# Patient Record
Sex: MALE | Race: BLACK OR AFRICAN AMERICAN | Employment: UNEMPLOYED | URBAN - METROPOLITAN AREA
[De-identification: names, ages, dates, MRNs, and addresses within clinical notes are randomized per-mention and may not be internally consistent; named-entity substitution may affect disease eponyms.]

---

## 2023-02-16 ENCOUNTER — APPOINTMENT (OUTPATIENT)
Dept: CT IMAGING | Age: 78
DRG: 689 | End: 2023-02-16
Attending: EMERGENCY MEDICINE
Payer: MEDICARE

## 2023-02-16 ENCOUNTER — HOSPITAL ENCOUNTER (INPATIENT)
Age: 78
LOS: 2 days | Discharge: HOME | DRG: 689 | End: 2023-02-19
Attending: EMERGENCY MEDICINE | Admitting: STUDENT IN AN ORGANIZED HEALTH CARE EDUCATION/TRAINING PROGRAM
Payer: MEDICARE

## 2023-02-16 DIAGNOSIS — F14.90 COCAINE USE: ICD-10-CM

## 2023-02-16 DIAGNOSIS — G93.41 METABOLIC ENCEPHALOPATHY: Primary | ICD-10-CM

## 2023-02-16 DIAGNOSIS — N39.0 ACUTE UTI: ICD-10-CM

## 2023-02-16 LAB
ALBUMIN SERPL-MCNC: 3.4 G/DL (ref 3.5–5)
ALBUMIN/GLOB SERPL: 0.7 (ref 1.1–2.2)
ALP SERPL-CCNC: 54 U/L (ref 45–117)
ALT SERPL-CCNC: 18 U/L (ref 12–78)
AMMONIA PLAS-SCNC: 22 UMOL/L
AMPHET UR QL SCN: NEGATIVE
ANION GAP BLD CALC-SCNC: 10 (ref 10–20)
ANION GAP SERPL CALC-SCNC: 6 MMOL/L (ref 5–15)
APPEARANCE UR: ABNORMAL
AST SERPL-CCNC: 17 U/L (ref 15–37)
BACTERIA URNS QL MICRO: ABNORMAL /HPF
BARBITURATES UR QL SCN: NEGATIVE
BASE DEFICIT BLDV-SCNC: 0.2 MMOL/L
BASOPHILS # BLD: 0 K/UL (ref 0–0.1)
BASOPHILS NFR BLD: 0 % (ref 0–1)
BDY SITE: NORMAL
BENZODIAZ UR QL: NEGATIVE
BILIRUB SERPL-MCNC: 0.3 MG/DL (ref 0.2–1)
BILIRUB UR QL: NEGATIVE
BUN SERPL-MCNC: 11 MG/DL (ref 6–20)
BUN/CREAT SERPL: 12 (ref 12–20)
CA-I BLD-MCNC: 1.16 MMOL/L (ref 1.12–1.32)
CALCIUM SERPL-MCNC: 8.7 MG/DL (ref 8.5–10.1)
CANNABINOIDS UR QL SCN: NEGATIVE
CHLORIDE BLD-SCNC: 103 MMOL/L (ref 100–108)
CHLORIDE SERPL-SCNC: 99 MMOL/L (ref 97–108)
CO2 BLD-SCNC: 28 MMOL/L (ref 19–24)
CO2 SERPL-SCNC: 31 MMOL/L (ref 21–32)
COCAINE UR QL SCN: POSITIVE
COLOR UR: ABNORMAL
CREAT SERPL-MCNC: 0.92 MG/DL (ref 0.7–1.3)
CREAT UR-MCNC: 0.7 MG/DL (ref 0.6–1.3)
DIFFERENTIAL METHOD BLD: NORMAL
DRUG SCRN COMMENT,DRGCM: ABNORMAL
EOSINOPHIL # BLD: 0.2 K/UL (ref 0–0.4)
EOSINOPHIL NFR BLD: 3 % (ref 0–7)
EPITH CASTS URNS QL MICRO: ABNORMAL /LPF
ERYTHROCYTE [DISTWIDTH] IN BLOOD BY AUTOMATED COUNT: 13.6 % (ref 11.5–14.5)
ETHANOL SERPL-MCNC: <10 MG/DL
GLOBULIN SER CALC-MCNC: 4.8 G/DL (ref 2–4)
GLUCOSE BLD STRIP.AUTO-MCNC: 138 MG/DL (ref 65–117)
GLUCOSE BLD STRIP.AUTO-MCNC: 142 MG/DL (ref 74–106)
GLUCOSE BLD STRIP.AUTO-MCNC: 151 MG/DL (ref 65–117)
GLUCOSE SERPL-MCNC: 143 MG/DL (ref 65–100)
GLUCOSE UR STRIP.AUTO-MCNC: NEGATIVE MG/DL
HCO3 BLDV-SCNC: 25 MMOL/L (ref 23–28)
HCT VFR BLD AUTO: 41.2 % (ref 36.6–50.3)
HGB BLD-MCNC: 13.6 G/DL (ref 12.1–17)
HGB UR QL STRIP: ABNORMAL
IMM GRANULOCYTES # BLD AUTO: 0 K/UL (ref 0–0.04)
IMM GRANULOCYTES NFR BLD AUTO: 0 % (ref 0–0.5)
KETONES UR QL STRIP.AUTO: NEGATIVE MG/DL
LACTATE BLD-SCNC: 1.61 MMOL/L (ref 0.4–2)
LEUKOCYTE ESTERASE UR QL STRIP.AUTO: ABNORMAL
LYMPHOCYTES # BLD: 1.9 K/UL (ref 0.8–3.5)
LYMPHOCYTES NFR BLD: 27 % (ref 12–49)
MCH RBC QN AUTO: 28.6 PG (ref 26–34)
MCHC RBC AUTO-ENTMCNC: 33 G/DL (ref 30–36.5)
MCV RBC AUTO: 86.6 FL (ref 80–99)
METHADONE UR QL: NEGATIVE
MONOCYTES # BLD: 0.5 K/UL (ref 0–1)
MONOCYTES NFR BLD: 7 % (ref 5–13)
NEUTS SEG # BLD: 4.4 K/UL (ref 1.8–8)
NEUTS SEG NFR BLD: 63 % (ref 32–75)
NITRITE UR QL STRIP.AUTO: POSITIVE
NRBC # BLD: 0 K/UL (ref 0–0.01)
NRBC BLD-RTO: 0 PER 100 WBC
OPIATES UR QL: NEGATIVE
PCO2 BLDV: 44.9 MMHG (ref 41–51)
PCP UR QL: NEGATIVE
PH BLDV: 7.37 (ref 7.32–7.42)
PH UR STRIP: 5.5 (ref 5–8)
PLATELET # BLD AUTO: 271 K/UL (ref 150–400)
PMV BLD AUTO: 10.7 FL (ref 8.9–12.9)
PO2 BLDV: 36 MMHG (ref 25–40)
POTASSIUM BLD-SCNC: 3.8 MMOL/L (ref 3.5–5.5)
POTASSIUM SERPL-SCNC: 4 MMOL/L (ref 3.5–5.1)
PROT SERPL-MCNC: 8.2 G/DL (ref 6.4–8.2)
PROT UR STRIP-MCNC: NEGATIVE MG/DL
RBC # BLD AUTO: 4.76 M/UL (ref 4.1–5.7)
RBC #/AREA URNS HPF: ABNORMAL /HPF (ref 0–5)
SAO2 % BLDV: 68 % (ref 65–88)
SAO2% DEVICE SAO2% SENSOR NAME: NORMAL
SERVICE CMNT-IMP: ABNORMAL
SERVICE CMNT-IMP: ABNORMAL
SODIUM BLD-SCNC: 141 MMOL/L (ref 136–145)
SODIUM SERPL-SCNC: 136 MMOL/L (ref 136–145)
SP GR UR REFRACTOMETRY: 1.01
SPECIMEN SITE: NORMAL
TSH SERPL DL<=0.05 MIU/L-ACNC: 1.31 UIU/ML (ref 0.36–3.74)
UA: UC IF INDICATED,UAUC: ABNORMAL
UROBILINOGEN UR QL STRIP.AUTO: 1 EU/DL (ref 0.2–1)
WBC # BLD AUTO: 7.1 K/UL (ref 4.1–11.1)
WBC URNS QL MICRO: ABNORMAL /HPF (ref 0–4)

## 2023-02-16 PROCEDURE — 80053 COMPREHEN METABOLIC PANEL: CPT

## 2023-02-16 PROCEDURE — 70450 CT HEAD/BRAIN W/O DYE: CPT

## 2023-02-16 PROCEDURE — 82140 ASSAY OF AMMONIA: CPT

## 2023-02-16 PROCEDURE — 81001 URINALYSIS AUTO W/SCOPE: CPT

## 2023-02-16 PROCEDURE — 87077 CULTURE AEROBIC IDENTIFY: CPT

## 2023-02-16 PROCEDURE — 74011250636 HC RX REV CODE- 250/636: Performed by: EMERGENCY MEDICINE

## 2023-02-16 PROCEDURE — 83605 ASSAY OF LACTIC ACID: CPT

## 2023-02-16 PROCEDURE — 96374 THER/PROPH/DIAG INJ IV PUSH: CPT

## 2023-02-16 PROCEDURE — 99285 EMERGENCY DEPT VISIT HI MDM: CPT

## 2023-02-16 PROCEDURE — 82803 BLOOD GASES ANY COMBINATION: CPT

## 2023-02-16 PROCEDURE — 85025 COMPLETE CBC W/AUTO DIFF WBC: CPT

## 2023-02-16 PROCEDURE — 36415 COLL VENOUS BLD VENIPUNCTURE: CPT

## 2023-02-16 PROCEDURE — 82962 GLUCOSE BLOOD TEST: CPT

## 2023-02-16 PROCEDURE — 84443 ASSAY THYROID STIM HORMONE: CPT

## 2023-02-16 PROCEDURE — 74011000250 HC RX REV CODE- 250: Performed by: EMERGENCY MEDICINE

## 2023-02-16 PROCEDURE — 96361 HYDRATE IV INFUSION ADD-ON: CPT

## 2023-02-16 PROCEDURE — 87186 SC STD MICRODIL/AGAR DIL: CPT

## 2023-02-16 PROCEDURE — 83036 HEMOGLOBIN GLYCOSYLATED A1C: CPT

## 2023-02-16 PROCEDURE — 82077 ASSAY SPEC XCP UR&BREATH IA: CPT

## 2023-02-16 PROCEDURE — 96375 TX/PRO/DX INJ NEW DRUG ADDON: CPT

## 2023-02-16 PROCEDURE — 87086 URINE CULTURE/COLONY COUNT: CPT

## 2023-02-16 PROCEDURE — 80307 DRUG TEST PRSMV CHEM ANLYZR: CPT

## 2023-02-16 PROCEDURE — 80047 BASIC METABLC PNL IONIZED CA: CPT

## 2023-02-16 PROCEDURE — 93005 ELECTROCARDIOGRAM TRACING: CPT

## 2023-02-16 RX ORDER — GUAIFENESIN 100 MG/5ML
81 LIQUID (ML) ORAL DAILY
Status: ON HOLD | COMMUNITY
End: 2023-02-17

## 2023-02-16 RX ORDER — PREGABALIN 75 MG/1
75 CAPSULE ORAL DAILY
COMMUNITY

## 2023-02-16 RX ORDER — FUROSEMIDE 80 MG/1
80 TABLET ORAL DAILY
Status: ON HOLD | COMMUNITY
End: 2023-02-17

## 2023-02-16 RX ORDER — TRAZODONE HYDROCHLORIDE 50 MG/1
TABLET ORAL
Status: ON HOLD | COMMUNITY
End: 2023-02-17

## 2023-02-16 RX ORDER — NALOXONE HYDROCHLORIDE 1 MG/ML
0.4 INJECTION INTRAMUSCULAR; INTRAVENOUS; SUBCUTANEOUS
Status: COMPLETED | OUTPATIENT
Start: 2023-02-16 | End: 2023-02-16

## 2023-02-16 RX ORDER — LISINOPRIL 5 MG/1
2.5 TABLET ORAL DAILY
COMMUNITY

## 2023-02-16 RX ORDER — TAMSULOSIN HYDROCHLORIDE 0.4 MG/1
0.4 CAPSULE ORAL DAILY
COMMUNITY

## 2023-02-16 RX ORDER — LORATADINE 10 MG/1
10 TABLET ORAL
COMMUNITY

## 2023-02-16 RX ORDER — DOCUSATE SODIUM 100 MG/1
200 CAPSULE, LIQUID FILLED ORAL DAILY
COMMUNITY

## 2023-02-16 RX ADMIN — NALOXONE HYDROCHLORIDE 0.4 MG: 1 INJECTION PARENTERAL at 19:07

## 2023-02-16 RX ADMIN — CEFTRIAXONE SODIUM 1 G: 1 INJECTION, POWDER, FOR SOLUTION INTRAMUSCULAR; INTRAVENOUS at 21:22

## 2023-02-16 NOTE — ED TRIAGE NOTES
Arrives by EMS c/o hyperglycemia. Per EM pt's BG reading >600 but pt's  when checked in ED. Pt is very somnolent not answering questions very well. Pt oriented to himself. Pt usually goes to [place] for care so we have no baseline for pt. Pt has vomit and urine on himself. Pt does not walk, uses a motorized wheelchair to get around.

## 2023-02-16 NOTE — ED PROVIDER NOTES
[facility] EMERGENCY DEPT  EMERGENCY DEPARTMENT ENCOUNTER       Pt Name: Hortencia Lazo  MRN: [id number]  [place] 1945  Date of evaluation: 2/16/2023  Provider: Kameron Manuel MD   PCP: None  Note Started: 6:51 PM 2/16/23     CHIEF COMPLAINT       Chief Complaint   Patient presents with    Altered mental status        HISTORY OF PRESENT ILLNESS: 1 or more elements      History From: EMS, patient History limited by: Fritz Stringer is a 66 y.o. male with history of diabetes who presents via EMS for altered mental status. EMS reports that his blood sugar was reading \"high\" as well. Please See MDM for Additional Details of the HPI/PMH  Nursing Notes were all reviewed and agreed with or any disagreements were addressed in the HPI. REVIEW OF SYSTEMS        Positives and Pertinent negatives as per HPI. PAST HISTORY     Past Medical History:  History reviewed. No pertinent past medical history. Past Surgical History:  History reviewed. No pertinent surgical history. Family History:  History reviewed. No pertinent family history. Social History:  Social History     Tobacco Use    Smoking status: Unknown       Allergies: Allergies   Allergen Reactions    Onion Unknown (comments)       CURRENT MEDICATIONS      Previous Medications    No medications on file       SCREENINGS               No data recorded         PHYSICAL EXAM      ED Triage Vitals [02/16/23 1818]   ED Encounter Vitals Group      /63      Pulse (Heart Rate) 68      Resp Rate 25      Temp 97.8 °F (36.6 °C)      Temp src       O2 Sat (%) 99 %      Weight 280 lb      Height 5' 10\"        Physical Exam  Vitals and nursing note reviewed. Constitutional:       General: He is not in acute distress. Appearance: He is well-developed. HENT:      Head: Normocephalic and atraumatic.    Eyes:      Conjunctiva/sclera: Conjunctivae normal.      Comments: Pinpoint pupils bilaterally   Cardiovascular:      Rate and Rhythm: Normal rate and regular rhythm. Pulmonary:      Effort: Pulmonary effort is normal. No respiratory distress. Breath sounds: Normal breath sounds. No stridor. Abdominal:      General: There is no distension. Palpations: Abdomen is soft. Tenderness: There is no abdominal tenderness. Musculoskeletal:         General: Normal range of motion. Cervical back: Normal range of motion. Skin:     General: Skin is warm and dry. Neurological:      Mental Status: He is lethargic. Comments: Arouses to loud verbal stimuli but quickly falls back asleep. Is able to state his name and birthday but speech is somewhat slurred        DIAGNOSTIC RESULTS   LABS:     Recent Results (from the past 12 hour(s))   GLUCOSE, POC    Collection Time: 02/16/23  6:17 PM   Result Value Ref Range    Glucose (POC) 151 (H) 65 - 117 mg/dL    Performed by Nicolás Silvestre EDT    CBC WITH AUTOMATED DIFF    Collection Time: 02/16/23  6:30 PM   Result Value Ref Range    WBC 7.1 4.1 - 11.1 K/uL    RBC 4.76 4.10 - 5.70 M/uL    HGB 13.6 12.1 - 17.0 g/dL    HCT 41.2 36.6 - 50.3 %    MCV 86.6 80.0 - 99.0 FL    MCH 28.6 26.0 - 34.0 PG    MCHC 33.0 30.0 - 36.5 g/dL    RDW 13.6 11.5 - 14.5 %    PLATELET 088 256 - 376 K/uL    MPV 10.7 8.9 - 12.9 FL    NRBC 0.0 0  WBC    ABSOLUTE NRBC 0.00 0.00 - 0.01 K/uL    NEUTROPHILS 63 32 - 75 %    LYMPHOCYTES 27 12 - 49 %    MONOCYTES 7 5 - 13 %    EOSINOPHILS 3 0 - 7 %    BASOPHILS 0 0 - 1 %    IMMATURE GRANULOCYTES 0 0.0 - 0.5 %    ABS. NEUTROPHILS 4.4 1.8 - 8.0 K/UL    ABS. LYMPHOCYTES 1.9 0.8 - 3.5 K/UL    ABS. MONOCYTES 0.5 0.0 - 1.0 K/UL    ABS. EOSINOPHILS 0.2 0.0 - 0.4 K/UL    ABS. BASOPHILS 0.0 0.0 - 0.1 K/UL    ABS. IMM.  GRANS. 0.0 0.00 - 0.04 K/UL    DF AUTOMATED     METABOLIC PANEL, COMPREHENSIVE    Collection Time: 02/16/23  6:30 PM   Result Value Ref Range    Sodium 136 136 - 145 mmol/L    Potassium 4.0 3.5 - 5.1 mmol/L    Chloride 99 97 - 108 mmol/L    CO2 31 21 - 32 mmol/L Anion gap 6 5 - 15 mmol/L    Glucose 143 (H) 65 - 100 mg/dL    BUN 11 6 - 20 MG/DL    Creatinine 0.92 0.70 - 1.30 MG/DL    BUN/Creatinine ratio 12 12 - 20      eGFR >60 >60 ml/min/1.73m2    Calcium 8.7 8.5 - 10.1 MG/DL    Bilirubin, total 0.3 0.2 - 1.0 MG/DL    ALT (SGPT) 18 12 - 78 U/L    AST (SGOT) 17 15 - 37 U/L    Alk. phosphatase 54 45 - 117 U/L    Protein, total 8.2 6.4 - 8.2 g/dL    Albumin 3.4 (L) 3.5 - 5.0 g/dL    Globulin 4.8 (H) 2.0 - 4.0 g/dL    A-G Ratio 0.7 (L) 1.1 - 2.2     TSH 3RD GENERATION    Collection Time: 02/16/23  6:30 PM   Result Value Ref Range    TSH 1.31 0.36 - 3.74 uIU/mL   AMMONIA    Collection Time: 02/16/23  6:30 PM   Result Value Ref Range    Ammonia, plasma 22 <32 UMOL/L   ETHYL ALCOHOL    Collection Time: 02/16/23  6:30 PM   Result Value Ref Range    ALCOHOL(ETHYL),SERUM <10 <10 MG/DL   GLUCOSE, POC    Collection Time: 02/16/23  6:30 PM   Result Value Ref Range    Glucose (POC) 138 (H) 65 - 117 mg/dL    Performed by Costa Renteria) EDT    BLOOD GAS, VENOUS    Collection Time: 02/16/23  6:33 PM   Result Value Ref Range    VENOUS PH 7.37 7.32 - 7.42      VENOUS PCO2 44.9 41 - 51 mmHg    VENOUS PO2 36 25 - 40 mmHg    VENOUS BICARBONATE 25 23 - 28 mmol/L    VENOUS BASE DEFICIT 0.2 mmol/L    VENOUS O2 SATURATION 68 65 - 88 %    O2 METHOD ROOM AIR      Sample source VENOUS BLOOD      SITE OTHER     POC CHEM8    Collection Time: 02/16/23  6:48 PM   Result Value Ref Range    CO2, POC 28 (H) 19 - 24 MMOL/L    Glucose,  (H) 74 - 106 MG/DL    Creatinine, POC 0.7 0.6 - 1.3 MG/DL    eGFR (POC) >60 >60 ml/min/1.73m2    Sodium,  136 - 145 MMOL/L    Potassium, POC 3.8 3.5 - 5.5 MMOL/L    Calcium, ionized (POC) 1.16 1.12 - 1.32 mmol/L    Chloride,  100 - 108 MMOL/L    Anion gap, POC 10 10 - 20     POC LACTIC ACID    Collection Time: 02/16/23  6:48 PM   Result Value Ref Range    Lactic Acid (POC) 1.61 0.40 - 2.00 mmol/L        EKG:  If performed, independent interpretation documented below in the MDM section     RADIOLOGY:  Non-plain film images such as CT, Ultrasound and MRI are read by the radiologist. Plain radiographic images are visualized and preliminarily interpreted by the ED Provider with the findings documented in the MDM section. Interpretation per the Radiologist below, if available at the time of this note:     CT HEAD WO CONT    Result Date: 2/16/2023  EXAM: CT HEAD WO CONT INDICATION: ams COMPARISON: None. CONTRAST: None. TECHNIQUE: Unenhanced CT of the head was performed using 5 mm images. Brain and bone windows were generated. Coronal and sagittal reformats. CT dose reduction was achieved through use of a standardized protocol tailored for this examination and automatic exposure control for dose modulation. FINDINGS: Motion degraded images limits diagnostic quality. The ventricles and sulci are normal in size, shape and configuration. Patchy periventricular and deep white matter ill-defined hypodensities, nonspecific and likely microangiopathic white matter disease. There is no intracranial hemorrhage, extra-axial collection, or mass effect. The basilar cisterns are open. No CT evidence of acute infarct. The bone windows demonstrate no abnormalities. The visualized portions of the paranasal sinuses and mastoid air cells are clear. Midline suboccipital subcutaneous architectural distortion/stranding may suggest prior trauma. No acute intracranial hemorrhage or infarct, allowing for limitation due to motion degraded images. Justine Carreno         PROCEDURES   Unless otherwise noted below, none  EKG    Date/Time: 2/16/2023 7:37 PM  Performed by: Jessica Suarez MD  Authorized by: Jessica Suarez MD     ECG reviewed by ED Physician in the absence of a cardiologist: yes    Interpretation:     Interpretation: non-specific    Rate:     ECG rate:  67    ECG rate assessment: normal    Rhythm:     Rhythm: sinus rhythm    Ectopy:     Ectopy: PVCs    QRS:     QRS axis: Normal    QRS intervals:  Normal    QRS conduction: normal    ST segments:     ST segments:  Non-specific  T waves:     T waves: non-specific    Critical Care  Performed by: Juan Pulido MD  Authorized by: Juan Pulido MD     Critical care provider statement:     Critical care time (minutes):  40    Critical care time was exclusive of:  Separately billable procedures and treating other patients    Critical care was necessary to treat or prevent imminent or life-threatening deterioration of the following conditions:  CNS failure or compromise    Critical care was time spent personally by me on the following activities:  Ordering and performing treatments and interventions, ordering and review of laboratory studies, ordering and review of radiographic studies, pulse oximetry, re-evaluation of patient's condition, review of old charts, examination of patient and evaluation of patient's response to treatment    Care discussed with: admitting provider       CRITICAL CARE TIME   above    [facility] and DIFFERENTIAL DIAGNOSIS/MDM   Vitals:    Vitals:    02/16/23 1818   BP: 127/63   Pulse: 68   Resp: 25   Temp: 97.8 °F (36.6 °C)   SpO2: 99%   Weight: 127 kg (280 lb)   Height: 5' 10\" (1.778 m)        Patient was given the following medications:  Medications   (NARCAN) injection (0.4 mg IntraVENous Given 2/16/23 1907)       Medical Decision Making  60-year-old male with a history of diabetes, hypertension, YOUSIF, wheelchair-bound at baseline due to a spinal cord injury who presents via EMS with concerns for altered mental status and hyperglycemia. EMS states that they were called for a \"unresponsive\" person but when they arrived patient was not unresponsive. They state that he was slowed respond to questions but was able to answer everything for them. They tell me that his blood sugar read \"high\".   [facility] but they were told that the [place] was on \"ICU diversion\" so they could not bring the patient there. They report the patient takes metformin but does not take anything else for diabetes. Patient is quite lethargic on presentation, arouses to loud verbal stimuli but quickly falls back asleep. Able to tell me his name and his date of birth. On exam, his vital signs are stable. He is not hypoxic on room air. Afebrile. Blood glucose on arrival 151. He does have very pinpoint pupils, unclear if his history includes opioid use or not. Differential includes electrolyte derangements, anemia, UTI, drug use, acute intracranial process such as stroke or bleed. Given the unclear last known well, patient would not be a candidate for lytics. I am also unsure whether or not he takes any anticoagulants. We will check basic lab work to rule out severe electrolyte derangements or anemia. Will check VBG to rule out hypercarbia as the cause of patient's somnolence. Will check head CT to rule out acute intracranial process. Will check alcohol level, ammonia, UDS to rule out other causes of altered mental status. We will try to get in touch with patient's family     Problems Addressed:  Acute UTI: acute illness or injury  Cocaine use: acute illness or injury  Metabolic encephalopathy: acute illness or injury    Amount and/or Complexity of Data Reviewed  Independent Historian:      Details: daughter  Labs: ordered. Decision-making details documented in ED Course. Radiology: ordered. Decision-making details documented in ED Course. ECG/medicine tests: ordered and independent interpretation performed. Decision-making details documented in ED Course. Risk  Prescription drug management. Decision regarding hospitalization. ED Course as of 02/17/23 1638   Thu Feb 16, 2023   1839 Spoke with patient's daughter, Izzy Overton. She states the patient has a history of diabetes, high blood pressure and does wear CPAP at night.   She denies any history of seizures and says he only occasionally drinks alcohol. Apparently the nurse called her brother to say that the patient was disoriented and has had some nausea and vomiting ongoing for about a week or so. He is from home. Daughter states she saw him a couple of days ago and he was fine. She reports that he is a full code. [FRANCO]   1933 No change with 0.4mg of narcan IV [FRANCO]   1940 Patient signed out to Dr. Clover Poon. Dispo pending remaining labs/urine/re-eval. Low threshold for admission [FRANCO]   2056 Urinalysis showing positive nitrites and leukocytes, +4 bacteria. UDS positive for cocaine. Patient remains altered and not at his baseline per her daughter, given above, plan for admission for IV antibiotic therapy and close neuro monitoring. [HW]   2100 ED Consult Note:   Aung Olea MD spoke with Dr. Kofi Jimenez  Specialty: Hospitalist   Discussed pt's hx, presentation, current and ongoing workup. Reviewed available diagnostic data and care plans. Agree with management and plan thus far. Consultant will admit patient. [HW]      ED Course User Index  [HW] Jossy Kc MD  Mattyronnie Pope MD         FINAL IMPRESSION     1. Metabolic encephalopathy    2. Acute UTI    3. Cocaine use          DISPOSITION/PLAN       CLINICAL IMPRESSION    Admit Note: Pt is being admitted by Dr. Sulma Castillo. The results of their tests and reason(s) for their admission have been discussed with pt and/or available family. They convey agreement and understanding for the need to be admitted and for the admission diagnosis. I am the Primary Clinician of Record. Janie Lechuga MD (electronically signed)    (Please note that parts of this dictation were completed with voice recognition software. Quite often unanticipated grammatical, syntax, homophones, and other interpretive errors are inadvertently transcribed by the computer software. Please disregards these errors.  Please excuse any errors that have escaped final proofreading.)

## 2023-02-17 ENCOUNTER — APPOINTMENT (OUTPATIENT)
Dept: GENERAL RADIOLOGY | Age: 78
DRG: 689 | End: 2023-02-17
Attending: STUDENT IN AN ORGANIZED HEALTH CARE EDUCATION/TRAINING PROGRAM
Payer: MEDICARE

## 2023-02-17 PROBLEM — G92.8 TOXIC METABOLIC ENCEPHALOPATHY: Status: ACTIVE | Noted: 2023-02-17

## 2023-02-17 LAB
ATRIAL RATE: 66 BPM
CALCULATED P AXIS, ECG09: 53 DEGREES
CALCULATED R AXIS, ECG10: 14 DEGREES
CALCULATED T AXIS, ECG11: 48 DEGREES
DIAGNOSIS, 93000: NORMAL
EST. AVERAGE GLUCOSE BLD GHB EST-MCNC: 140 MG/DL
GLUCOSE BLD STRIP.AUTO-MCNC: 117 MG/DL (ref 65–117)
GLUCOSE BLD STRIP.AUTO-MCNC: 123 MG/DL (ref 65–117)
GLUCOSE BLD STRIP.AUTO-MCNC: 133 MG/DL (ref 65–117)
GLUCOSE BLD STRIP.AUTO-MCNC: 178 MG/DL (ref 65–117)
GLUCOSE BLD STRIP.AUTO-MCNC: 88 MG/DL (ref 65–117)
HBA1C MFR BLD: 6.5 % (ref 4–5.6)
P-R INTERVAL, ECG05: 190 MS
Q-T INTERVAL, ECG07: 428 MS
QRS DURATION, ECG06: 94 MS
QTC CALCULATION (BEZET), ECG08: 448 MS
SERVICE CMNT-IMP: ABNORMAL
SERVICE CMNT-IMP: NORMAL
SERVICE CMNT-IMP: NORMAL
TROPONIN I SERPL HS-MCNC: 14 NG/L (ref 0–76)
VENTRICULAR RATE, ECG03: 66 BPM

## 2023-02-17 PROCEDURE — 71045 X-RAY EXAM CHEST 1 VIEW: CPT

## 2023-02-17 PROCEDURE — 82962 GLUCOSE BLOOD TEST: CPT

## 2023-02-17 PROCEDURE — 74011250637 HC RX REV CODE- 250/637: Performed by: INTERNAL MEDICINE

## 2023-02-17 PROCEDURE — 65270000032 HC RM SEMIPRIVATE

## 2023-02-17 PROCEDURE — 84484 ASSAY OF TROPONIN QUANT: CPT

## 2023-02-17 PROCEDURE — 74011000250 HC RX REV CODE- 250: Performed by: STUDENT IN AN ORGANIZED HEALTH CARE EDUCATION/TRAINING PROGRAM

## 2023-02-17 PROCEDURE — 97530 THERAPEUTIC ACTIVITIES: CPT | Performed by: PHYSICAL THERAPIST

## 2023-02-17 PROCEDURE — 97165 OT EVAL LOW COMPLEX 30 MIN: CPT | Performed by: OCCUPATIONAL THERAPIST

## 2023-02-17 PROCEDURE — 74011250636 HC RX REV CODE- 250/636: Performed by: STUDENT IN AN ORGANIZED HEALTH CARE EDUCATION/TRAINING PROGRAM

## 2023-02-17 PROCEDURE — 97530 THERAPEUTIC ACTIVITIES: CPT | Performed by: OCCUPATIONAL THERAPIST

## 2023-02-17 PROCEDURE — 74011250637 HC RX REV CODE- 250/637: Performed by: STUDENT IN AN ORGANIZED HEALTH CARE EDUCATION/TRAINING PROGRAM

## 2023-02-17 PROCEDURE — 97161 PT EVAL LOW COMPLEX 20 MIN: CPT | Performed by: PHYSICAL THERAPIST

## 2023-02-17 PROCEDURE — 87150 DNA/RNA AMPLIFIED PROBE: CPT

## 2023-02-17 PROCEDURE — 74011000258 HC RX REV CODE- 258: Performed by: STUDENT IN AN ORGANIZED HEALTH CARE EDUCATION/TRAINING PROGRAM

## 2023-02-17 PROCEDURE — 87040 BLOOD CULTURE FOR BACTERIA: CPT

## 2023-02-17 RX ORDER — IBUPROFEN 200 MG
4 TABLET ORAL AS NEEDED
Status: DISCONTINUED | OUTPATIENT
Start: 2023-02-17 | End: 2023-02-19 | Stop reason: HOSPADM

## 2023-02-17 RX ORDER — SODIUM CHLORIDE 0.9 % (FLUSH) 0.9 %
5-40 SYRINGE (ML) INJECTION EVERY 8 HOURS
Status: DISCONTINUED | OUTPATIENT
Start: 2023-02-17 | End: 2023-02-19 | Stop reason: HOSPADM

## 2023-02-17 RX ORDER — TAMSULOSIN HYDROCHLORIDE 0.4 MG/1
0.4 CAPSULE ORAL DAILY
Status: DISCONTINUED | OUTPATIENT
Start: 2023-02-17 | End: 2023-02-19 | Stop reason: HOSPADM

## 2023-02-17 RX ORDER — SODIUM CHLORIDE 9 MG/ML
150 INJECTION, SOLUTION INTRAVENOUS CONTINUOUS
Status: DISCONTINUED | OUTPATIENT
Start: 2023-02-17 | End: 2023-02-17

## 2023-02-17 RX ORDER — FLUTICASONE PROPIONATE 50 MCG
2 SPRAY, SUSPENSION (ML) NASAL 2 TIMES DAILY
Status: DISCONTINUED | OUTPATIENT
Start: 2023-02-18 | End: 2023-02-19 | Stop reason: HOSPADM

## 2023-02-17 RX ORDER — MESALAMINE 400 MG/1
800 CAPSULE, DELAYED RELEASE ORAL 3 TIMES DAILY
Status: DISCONTINUED | OUTPATIENT
Start: 2023-02-18 | End: 2023-02-19 | Stop reason: HOSPADM

## 2023-02-17 RX ORDER — ACETAMINOPHEN 650 MG/1
650 SUPPOSITORY RECTAL
Status: DISCONTINUED | OUTPATIENT
Start: 2023-02-17 | End: 2023-02-19 | Stop reason: HOSPADM

## 2023-02-17 RX ORDER — POLYETHYLENE GLYCOL 3350 17 G/17G
17 POWDER, FOR SOLUTION ORAL DAILY PRN
Status: DISCONTINUED | OUTPATIENT
Start: 2023-02-17 | End: 2023-02-19 | Stop reason: HOSPADM

## 2023-02-17 RX ORDER — INSULIN LISPRO 100 [IU]/ML
INJECTION, SOLUTION INTRAVENOUS; SUBCUTANEOUS EVERY 6 HOURS
Status: DISCONTINUED | OUTPATIENT
Start: 2023-02-17 | End: 2023-02-17

## 2023-02-17 RX ORDER — DEXTROSE MONOHYDRATE 100 MG/ML
0-250 INJECTION, SOLUTION INTRAVENOUS AS NEEDED
Status: DISCONTINUED | OUTPATIENT
Start: 2023-02-17 | End: 2023-02-19 | Stop reason: HOSPADM

## 2023-02-17 RX ORDER — ACETAMINOPHEN 325 MG/1
650 TABLET ORAL
COMMUNITY

## 2023-02-17 RX ORDER — PREGABALIN 75 MG/1
75 CAPSULE ORAL 2 TIMES DAILY
Status: DISCONTINUED | OUTPATIENT
Start: 2023-02-17 | End: 2023-02-17

## 2023-02-17 RX ORDER — ONDANSETRON 4 MG/1
4 TABLET, ORALLY DISINTEGRATING ORAL
Status: DISCONTINUED | OUTPATIENT
Start: 2023-02-17 | End: 2023-02-19 | Stop reason: HOSPADM

## 2023-02-17 RX ORDER — GUAIFENESIN 100 MG/5ML
200 SOLUTION ORAL
COMMUNITY

## 2023-02-17 RX ORDER — PREGABALIN 75 MG/1
150 CAPSULE ORAL
Status: DISCONTINUED | OUTPATIENT
Start: 2023-02-17 | End: 2023-02-19 | Stop reason: HOSPADM

## 2023-02-17 RX ORDER — SODIUM CHLORIDE 0.9 % (FLUSH) 0.9 %
5-40 SYRINGE (ML) INJECTION AS NEEDED
Status: DISCONTINUED | OUTPATIENT
Start: 2023-02-17 | End: 2023-02-19 | Stop reason: HOSPADM

## 2023-02-17 RX ORDER — ALBUTEROL SULFATE 90 UG/1
2 AEROSOL, METERED RESPIRATORY (INHALATION)
COMMUNITY

## 2023-02-17 RX ORDER — BACLOFEN 10 MG/1
20 TABLET ORAL DAILY
COMMUNITY

## 2023-02-17 RX ORDER — BACLOFEN 10 MG/1
20 TABLET ORAL DAILY
Status: DISCONTINUED | OUTPATIENT
Start: 2023-02-18 | End: 2023-02-19 | Stop reason: HOSPADM

## 2023-02-17 RX ORDER — CAPSAICIN 0.03 G/100G
1 CREAM TOPICAL
COMMUNITY

## 2023-02-17 RX ORDER — DIPHENHYDRAMINE HCL 25 MG
2 CAPSULE ORAL
COMMUNITY

## 2023-02-17 RX ORDER — PREGABALIN 150 MG/1
150 CAPSULE ORAL
COMMUNITY

## 2023-02-17 RX ORDER — ROSUVASTATIN CALCIUM 40 MG/1
40 TABLET, COATED ORAL DAILY
COMMUNITY

## 2023-02-17 RX ORDER — CETIRIZINE HYDROCHLORIDE 10 MG/1
10 TABLET ORAL DAILY
Status: DISCONTINUED | OUTPATIENT
Start: 2023-02-18 | End: 2023-02-19 | Stop reason: HOSPADM

## 2023-02-17 RX ORDER — CALCIUM CARBONATE 200(500)MG
1 TABLET,CHEWABLE ORAL
COMMUNITY

## 2023-02-17 RX ORDER — SENNOSIDES 8.6 MG/1
1 TABLET ORAL
COMMUNITY

## 2023-02-17 RX ORDER — SENNOSIDES 8.6 MG/1
1 TABLET ORAL
Status: DISCONTINUED | OUTPATIENT
Start: 2023-02-17 | End: 2023-02-19 | Stop reason: HOSPADM

## 2023-02-17 RX ORDER — FLUTICASONE PROPIONATE 50 MCG
2 SPRAY, SUSPENSION (ML) NASAL 2 TIMES DAILY
COMMUNITY

## 2023-02-17 RX ORDER — CHOLECALCIFEROL (VITAMIN D3) 125 MCG
1 CAPSULE ORAL DAILY
COMMUNITY

## 2023-02-17 RX ORDER — ONDANSETRON 2 MG/ML
4 INJECTION INTRAMUSCULAR; INTRAVENOUS
Status: DISCONTINUED | OUTPATIENT
Start: 2023-02-17 | End: 2023-02-19 | Stop reason: HOSPADM

## 2023-02-17 RX ORDER — DOCUSATE SODIUM 100 MG/1
100 CAPSULE, LIQUID FILLED ORAL 2 TIMES DAILY
Status: DISCONTINUED | OUTPATIENT
Start: 2023-02-17 | End: 2023-02-19 | Stop reason: HOSPADM

## 2023-02-17 RX ORDER — GUAIFENESIN 100 MG/5ML
81 LIQUID (ML) ORAL DAILY
Status: DISCONTINUED | OUTPATIENT
Start: 2023-02-17 | End: 2023-02-19 | Stop reason: HOSPADM

## 2023-02-17 RX ORDER — TIZANIDINE 4 MG/1
4 TABLET ORAL
Status: DISCONTINUED | OUTPATIENT
Start: 2023-02-17 | End: 2023-02-19 | Stop reason: HOSPADM

## 2023-02-17 RX ORDER — INSULIN LISPRO 100 [IU]/ML
INJECTION, SOLUTION INTRAVENOUS; SUBCUTANEOUS
Status: DISCONTINUED | OUTPATIENT
Start: 2023-02-17 | End: 2023-02-19 | Stop reason: HOSPADM

## 2023-02-17 RX ORDER — ACETAMINOPHEN 325 MG/1
650 TABLET ORAL
Status: DISCONTINUED | OUTPATIENT
Start: 2023-02-17 | End: 2023-02-19 | Stop reason: HOSPADM

## 2023-02-17 RX ORDER — MESALAMINE 0.38 G/1
1.5 CAPSULE, EXTENDED RELEASE ORAL DAILY
COMMUNITY

## 2023-02-17 RX ORDER — LISINOPRIL 5 MG/1
5 TABLET ORAL DAILY
Status: DISCONTINUED | OUTPATIENT
Start: 2023-02-17 | End: 2023-02-18

## 2023-02-17 RX ORDER — TIZANIDINE 4 MG/1
4 TABLET ORAL
COMMUNITY

## 2023-02-17 RX ORDER — ROSUVASTATIN CALCIUM 10 MG/1
40 TABLET, COATED ORAL DAILY
Status: DISCONTINUED | OUTPATIENT
Start: 2023-02-18 | End: 2023-02-19 | Stop reason: HOSPADM

## 2023-02-17 RX ORDER — BACLOFEN 10 MG/1
30 TABLET ORAL 2 TIMES DAILY
COMMUNITY

## 2023-02-17 RX ORDER — HYDROCHLOROTHIAZIDE 12.5 MG/1
12.5 TABLET ORAL DAILY
COMMUNITY

## 2023-02-17 RX ORDER — METFORMIN HYDROCHLORIDE 1000 MG/1
1000 TABLET ORAL 2 TIMES DAILY WITH MEALS
COMMUNITY

## 2023-02-17 RX ADMIN — PREGABALIN 150 MG: 75 CAPSULE ORAL at 22:52

## 2023-02-17 RX ADMIN — TAMSULOSIN HYDROCHLORIDE 0.4 MG: 0.4 CAPSULE ORAL at 08:51

## 2023-02-17 RX ADMIN — SODIUM CHLORIDE, PRESERVATIVE FREE 10 ML: 5 INJECTION INTRAVENOUS at 22:52

## 2023-02-17 RX ADMIN — CEFEPIME 2 G: 2 INJECTION, POWDER, FOR SOLUTION INTRAVENOUS at 08:51

## 2023-02-17 RX ADMIN — ASPIRIN 81 MG CHEWABLE TABLET 81 MG: 81 TABLET CHEWABLE at 08:51

## 2023-02-17 RX ADMIN — SODIUM CHLORIDE 1000 ML: 9 INJECTION, SOLUTION INTRAVENOUS at 00:37

## 2023-02-17 RX ADMIN — SODIUM CHLORIDE 150 ML/HR: 9 INJECTION, SOLUTION INTRAVENOUS at 01:57

## 2023-02-17 RX ADMIN — SODIUM CHLORIDE 150 ML/HR: 9 INJECTION, SOLUTION INTRAVENOUS at 08:55

## 2023-02-17 RX ADMIN — TIZANIDINE 4 MG: 4 TABLET ORAL at 22:52

## 2023-02-17 RX ADMIN — LISINOPRIL 5 MG: 5 TABLET ORAL at 08:51

## 2023-02-17 RX ADMIN — SODIUM CHLORIDE 2 G: 9 INJECTION INTRAMUSCULAR; INTRAVENOUS; SUBCUTANEOUS at 00:37

## 2023-02-17 RX ADMIN — SENNOSIDES 8.6 MG: 8.6 TABLET, FILM COATED ORAL at 22:52

## 2023-02-17 RX ADMIN — CEFEPIME 2 G: 2 INJECTION, POWDER, FOR SOLUTION INTRAVENOUS at 16:25

## 2023-02-17 RX ADMIN — DOCUSATE SODIUM 100 MG: 100 CAPSULE, LIQUID FILLED ORAL at 08:51

## 2023-02-17 RX ADMIN — DOCUSATE SODIUM 100 MG: 100 CAPSULE, LIQUID FILLED ORAL at 18:00

## 2023-02-17 RX ADMIN — SODIUM CHLORIDE, PRESERVATIVE FREE 10 ML: 5 INJECTION INTRAVENOUS at 00:18

## 2023-02-17 RX ADMIN — PREGABALIN 75 MG: 75 CAPSULE ORAL at 18:00

## 2023-02-17 RX ADMIN — PREGABALIN 75 MG: 75 CAPSULE ORAL at 08:51

## 2023-02-17 NOTE — PROGRESS NOTES
[facility]: MED RECONCILIATION    Comments/Recommendations:   Med rec performed via call to [facility] to obtain medication refill history. Patient was a poor historian but reports all medications come from [facility]. Patient reports having home health once per month to help set up his medications for him. [facility], patient[facility] 2022. Per patient, he is not currently taking aspirin. Med rec based on refill history - please interpret with caution. Allergies: Onion    Prior to Admission Medications:       Prior to Admission Medications   Prescriptions Last Dose Informant Patient Reported? Taking?   acetaminophen (TYLENOL) 325 mg tablet  Other Yes Yes   Sig: Take 650 mg by mouth every four (4) hours as needed for Pain. albuterol (PROVENTIL HFA, VENTOLIN HFA, PROAIR HFA) 90 mcg/actuation inhaler  Other Yes Yes   Sig: Take 2 Puffs by inhalation every six (6) hours as needed for Wheezing. baclofen (LIORESAL) 10 mg tablet  Other Yes Yes   Sig: Take 20 mg by mouth daily. Every morning   baclofen (LIORESAL) 10 mg tablet  Other Yes Yes   Sig: Take 30 mg by mouth two (2) times a day. At 1400, 2000   calcium carbonate (TUMS) 200 mg calcium (500 mg) chew  Other Yes Yes   Sig: Take 1 Tablet by mouth four (4) times daily as needed (indigestion). capsicum oleoresin 0.025 % topical cream  Other Yes Yes   Sig: Apply 1 Each to affected area three (3) times daily as needed for Pain (shoulder pain). cholecalciferol, vitamin D3, 50 mcg (2,000 unit) tab  Other Yes Yes   Sig: Take 1 Tablet by mouth daily. dextran 70-hypromellose (Artificial Tears,[id number],) ophthalmic solution  Other Yes Yes   Sig: Administer 2 Drops to both eyes four (4) times daily as needed (dry eye). docusate sodium (COLACE) 100 mg capsule  Other Yes Yes   Sig: Take 200 mg by mouth daily.    fluticasone propionate (FLONASE) 50 mcg/actuation nasal spray  Other Yes Yes   Si Sprays by Both Nostrils route two (2) times a day.   guaiFENesin (ROBITUSSIN) 100 mg/5 mL liquid  Other Yes Yes   Sig: Take 200 mg by mouth three (3) times daily as needed for Cough. hydroCHLOROthiazide (HYDRODIURIL) 12.5 mg tablet  Other Yes Yes   Sig: Take 12.5 mg by mouth daily. lisinopriL (PRINIVIL, ZESTRIL) 5 mg tablet  Other Yes Yes   Sig: Take 2.5 mg by mouth daily. loratadine (CLARITIN) 10 mg tablet  Other Yes Yes   Sig: Take 10 mg by mouth daily as needed for Allergies. mesalamine ER (APRISO) 0.375 gram 24 hour capsule  Other Yes Yes   Sig: Take 1.5 g by mouth daily. metFORMIN (GLUCOPHAGE) 1,000 mg tablet  Other Yes Yes   Sig: Take 1,000 mg by mouth two (2) times daily (with meals). pregabalin (LYRICA) 150 mg capsule  Other Yes Yes   Sig: Take 150 mg by mouth nightly. pregabalin (LYRICA) 75 mg capsule  Other Yes Yes   Sig: Take 75 mg by mouth daily. rosuvastatin (CRESTOR) 40 mg tablet  Other Yes Yes   Sig: Take 40 mg by mouth daily. senna (SENOKOT) 8.6 mg tablet  Other Yes Yes   Sig: Take 1 Tablet by mouth nightly. tamsulosin (FLOMAX) 0.4 mg capsule  Other Yes Yes   Sig: Take 0.4 mg by mouth daily. tiZANidine (ZANAFLEX) 4 mg tablet  Other Yes Yes   Sig: Take 4 mg by mouth nightly as needed for Muscle Spasm(s).       Facility-Administered Medications: None         CRISTOBAL Clinton   Contact: [phone]

## 2023-02-17 NOTE — PROGRESS NOTES
Problem: Self Care Deficits Care Plan (Adult)  Goal: *Acute Goals and Plan of Care (Insert Text)  Description: FUNCTIONAL STATUS PRIOR TO ADMISSION: Pt is w/c dependant at baseline, has aides 7 hours a day for assist with ADLs and is able to perform stand pivot transfers and toileting with mod I. Pt has an adapted vehicle and drives with hand controls. HOME SUPPORT: The patient lived with aides 7 hours a day and required moderate assistance for LE ADLs, up to max A IADLs. Occupational Therapy Goals  Initiated 2/17/2023  1. Patient will perform toilet transfers to drop arm heavy duty [facility] with supervision/set-up within 7 day(s). 2.  Patient will perform all aspects of toileting with supervision/set-up within 7 day(s). 3.  Patient will participate in upper extremity therapeutic exercise/activities with independence for 10 minutes within 7 day(s). 4.  Patient will utilize energy conservation techniques during functional activities with verbal and visual cues within 7 day(s). Outcome: Progressing Towards Goal     OCCUPATIONAL THERAPY EVALUATION  Patient: Cheri Griffiths (31 y.o. male)  Date: 2/17/2023  Primary Diagnosis: Toxic metabolic encephalopathy [O67.9]       Precautions: Fall (pt is w/c dependant)    ASSESSMENT  Based on the objective data described below, the patient presents with mild cognitive impairments, decreased strength, endurance, mobility, balance and safety. Pt is below his baseline function and requiring min A for stand pivot transfers and toileting. Recommend HH therapy at discharge. Current Level of Function Impacting Discharge (ADLs/self-care): min A for stand pivot transfers and toileting    Functional Outcome Measure: The patient scored Total: 60/100 on the Barthel Index outcome measure. Other factors to consider for discharge: see above     Patient will benefit from skilled therapy intervention to address the above noted impairments.        PLAN :  Recommendations and Planned Interventions: self care training, functional mobility training, therapeutic exercise, balance training, therapeutic activities, cognitive retraining, endurance activities, neuromuscular re-education, patient education, home safety training, and family training/education    Frequency/Duration: Patient will be followed by occupational therapy 2 times a week to address goals. Recommendation for discharge: (in order for the patient to meet his/her long term goals)  Occupational therapy at least 2 days/week in the home AND ensure assist and/or supervision for safety     This discharge recommendation:  Has been made in collaboration with the attending provider and/or case management    IF patient discharges home will need the following DME: patient owns DME required for discharge       SUBJECTIVE:   Patient stated I feel better.     OBJECTIVE DATA SUMMARY:   HISTORY:   History reviewed. No pertinent past medical history. History reviewed. No pertinent surgical history. Expanded or extensive additional review of patient history:     Home Situation  Home Environment: Private residence  # Steps to Enter: 0  Wheelchair Ramp: Yes  One/Two Story Residence: One story  Living Alone: Yes  Support Systems: Child(cornell), Caregiver/Home Care Staff (aides 7 hours daily; daughter, friend)  Patient Expects to be Discharged to[de-identified] Home with family assistance  Current DME Used/Available at Home: Wheelchair, power, Wheelchair, Grab bars, Shower chair, Hospital bed  Tub or Shower Type: Shower    Hand dominance: Right    EXAMINATION OF PERFORMANCE DEFICITS:  Cognitive/Behavioral Status:  Neurologic State: Alert  Orientation Level: Oriented to person;Oriented to place;Oriented to time;Disoriented to situation  Cognition: Appropriate for age attention/concentration; Follows commands  Perception: Cues to maintain midline in standing; Tactile;Verbal;Visual  Perseveration: No perseveration noted  Safety/Judgement: Awareness of environment; Fall prevention;Decreased awareness of need for safety;Decreased insight into deficits    Hearing: Auditory  Auditory Impairment: None    Vision/Perceptual:    Acuity: Impaired near vision; Impaired far vision    Corrective Lenses:  (unable to find glasses)    Range of Motion:  AROM: Generally decreased, functional  PROM: Generally decreased, functional                      Strength:  Strength: Generally decreased, functional                Coordination:  Coordination: Generally decreased, functional  Fine Motor Skills-Upper: Left Intact; Right Intact    Gross Motor Skills-Upper: Left Intact; Right Intact    Tone & Sensation:  Tone: Abnormal  Sensation: Impaired                      Balance:  Sitting: Intact  Standing: Impaired; With support (1 major LOB he wasn't able to self correct)  Standing - Static: Fair;Constant support  Standing - Dynamic : Fair;Constant support    Functional Mobility and Transfers for ADLs:  Bed Mobility:  Rolling: Supervision; Additional time (bed rail)  Supine to Sit: Contact guard assistance  Sit to Supine: Contact guard assistance  Scooting: Stand-by assistance; Additional time    Transfers:  Sit to Stand: Minimum assistance; Additional time;Assist x1  Stand to Sit: Contact guard assistance; Additional time;Assist x1  Bed to Chair: Minimum assistance; Additional time;Assist x1 (stand pivot transfer)  Toilet Transfer : Minimum assistance; Additional time;Assist x1 (stand pivot transfer)  Assistive Device : Gait Belt    ADL Assessment:  Feeding: Independent    Oral Facial Hygiene/Grooming: Setup; Additional time (seated)    Bathing: Moderate assistance; Additional time;Assist x1 (aide assists daily)    Type of Bath: Chlorhexidine (CHG)    Upper Body Dressing: Setup    Lower Body Dressing: Moderate assistance; Additional time (aides assist)    Toileting: Minimum assistance; Additional time;Assist x1                ADL Intervention and task modifications:  Patient instructed and indicated understanding energy conservation techniques to increase independence and safety during ADLs. Cognitive Retraining  Safety/Judgement: Awareness of environment; Fall prevention;Decreased awareness of need for safety;Decreased insight into deficits      Functional Measure:    Barthel Index:  Bathin  Bladder: 10  Bowels: 10  Groomin  Dressin  Feeding: 10  Mobility: 5  Stairs: 0  Toilet Use: 5  Transfer (Bed to Chair and Back): 10  Total: 60/100      The Barthel ADL Index: Guidelines  1. The index should be used as a record of what a patient does, not as a record of what a patient could do. 2. The main aim is to establish degree of independence from any help, physical or verbal, however minor and for whatever reason. 3. The need for supervision renders the patient not independent. 4. A patient's performance should be established using the best available evidence. Asking the patient, friends/relatives and nurses are the usual sources, but direct observation and common sense are also important. However direct testing is not needed. 5. Usually the patient's performance over the preceding 24-48 hours is important, but occasionally longer periods will be relevant. 6. Middle categories imply that the patient supplies over 50 per cent of the effort. 7. Use of aids to be independent is allowed. Score Interpretation (from [address] [address])    Independent   60-79 Minimally independent   40-59 Partially dependent   20-39 Very dependent   <20 Totally dependent     -La Andrade., Barthel, D.W. (1965). Functional evaluation: the Barthel Index. [facility] ([address] (). The Barthel activities of daily living index: self-reporting versus actual performance in the old (> or = 75 years). Journal of [address] [place] [address] 45(7), [facility], J.J.M.F, Curt Miranda., Fadi Soto. (1999).  Measuring the change in disability after inpatient rehabilitation; comparison of the responsiveness of the Barthel Index and Functional [place] Measure. Journal of Neurology, Neurosurgery, and Psychiatry, 66(4), 135-341. NOLBETRO Dewey, FRANCHESCA Fraga, & Meagan Monsalve M.A. (2004) Assessment of post-stroke quality of life in cost-effectiveness studies: The usefulness of the Barthel Index and the EuroQoL-5D. Quality of Life Research, 15, 248-14     Occupational Therapy Evaluation Charge Determination   History Examination Decision-Making   LOW Complexity : Brief history review  HIGH Complexity : 5 or more performance deficits relating to physical, cognitive , or psychosocial skils that result in activity limitations and / or participation restrictions HIGH Complexity : Patient presents with comorbidities that affect occupational performance. Signifigant modification of tasks or assistance (eg, physical or verbal) with assessment (s) is necessary to enable patient to complete evaluation       Based on the above components, the patient evaluation is determined to be of the following complexity level: LOW   Pain Ratin/10    Activity Tolerance:   Fair and requires rest breaks    After treatment patient left in no apparent distress:    Supine in bed, Call bell within reach, and Caregiver / family present    COMMUNICATION/EDUCATION:   The patients plan of care was discussed with: Physical therapist, Registered nurse, and Case management. Home safety education was provided and the patient/caregiver indicated understanding., Patient/family have participated as able in goal setting and plan of care. , and Patient/family agree to work toward stated goals and plan of care. This patients plan of care is appropriate for delegation to [facility].     Thank you for this referral.  Kulwinder Pendleton OT  Time Calculation: 28 mins

## 2023-02-17 NOTE — ED NOTES
Pt presents to ED complaining of altered mental status. EMS states that pt was found outside by bystanders and that he had vomit on him. EMS stated that pt has hx of lower spinal cord injury and diabetes, and on scene, pt's blood sugar was >600. Pt has pinpoint pupils and arrives to ED snoring and unable to verbalize. Pt is alert and oriented x 0, RR even and unlabored, skin is warm and dry. Assessment completed and pt updated on plan of care. Call bell in reach. Emergency Department Nursing Plan of Care       The Nursing Plan of Care is developed from the Nursing assessment and Emergency Department Attending provider initial evaluation. The plan of care may be reviewed in the ED Provider note.     The Plan of Care was developed with the following considerations:   Patient / Family readiness to learn indicated by:verbalized understanding  Persons(s) to be included in education: patient  Barriers to Learning/Limitations:No    Signed     April Patino, SLOAN    2/16/2023

## 2023-02-17 NOTE — PROGRESS NOTES
Day #1 of Cefepime  Indication:  Sepsis  Current regimen:  2 grams q12h  Abx regimen: Cefepime  Recent Labs     23  1830   WBC 7.1   CREA 0.92   BUN 11     Est CrCl: 89 ml/min  Temp (24hrs), Av.8 °F (36.6 °C), Min:97.8 °F (36.6 °C), Max:97.8 °F (36.6 °C)    Cultures: none    Plan: Change to 2 grams q8h

## 2023-02-17 NOTE — H&P
History & Physical    Primary Care Provider: None  Source of Information: Patient and chart review    History of Presenting Illness:   Bry Hollingsworth is a 66 y.o. 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 History is obtained from ED staff and chart review as family's not available at bedside for additional history. Per chart review EMS was called for concerns of hypoglycemia with reported blood sugar readings greater than 600. However blood glucoses 130 in ED presentation. He is noted to be somnolent but arousable at time of my evaluation, he is still somnolent but arousable and answers to yes or no questions. Remarkable vitals on ER Presentation: vss  Labs Remarkable for: Glucose 143, urinalysis with cloudy urine with positive nitrites, leukocyte esterase and 4+ bacteria UDS positive for cocaine  ER Images: ct      Review of Systems:  Review of systems not obtained due to patient factors. History reviewed. No pertinent past medical history. History reviewed. No pertinent surgical history. Prior to Admission medications    Medication Sig Start Date End Date Taking? Authorizing Provider   loratadine (CLARITIN) 10 mg tablet Take 10 mg by mouth. Bhavana Roberts MD   pregabalin (LYRICA) 75 mg capsule Take  by mouth. Bhavana Roberts MD   docusate sodium (COLACE) 100 mg capsule Take 100 mg by mouth two (2) times a day. Bhavana Roberts MD   traZODone (DESYREL) 50 mg tablet Take  by mouth nightly. Bhavana Roberts MD   lisinopriL (PRINIVIL, ZESTRIL) 5 mg tablet Take  by mouth daily. Bhavana Roberts MD   tamsulosin (FLOMAX) 0.4 mg capsule Take 0.4 mg by mouth daily. Bhavana Roberts MD   furosemide (LASIX) 80 mg tablet Take 80 mg by mouth daily.     Bhavana Roberts MD   aspirin 81 mg chewable tablet Take 81 mg by mouth daily. Other, MD Bhavana     Allergies   Allergen Reactions    Onion Unknown (comments)      History reviewed. No pertinent family history. SOCIAL HISTORY:  Patient resides:  Independently x   Assisted Living    SNF    With family care       Smoking history:   None x   Former    Chronic      Alcohol history:   None x   Social    Chronic      Ambulates:   Independently x   w/cane    w/walker    w/wc    CODE STATUS:  DNR    Full x   Other      Objective:     Physical Exam:     Visit Vitals  /67   Pulse 66   Temp 97.8 °F (36.6 °C)   Resp 13   Ht 5' 10\" (1.778 m)   Wt 127 kg (280 lb)   SpO2 99%   BMI 40.18 kg/m²      O2 Device: None (Room air)    General:  Somnolent but arousable. Follows commands   Head:  Normocephalic, without obvious abnormality, atraumatic. Eyes:  Conjunctivae/corneas clear. PERRL, EOMs intact. Nose: Nares normal. Septum midline. Mucosa normal.        Neck: Supple, symmetrical, trachea midline. Lungs:   Clear to auscultation bilaterally. Chest wall:  No tenderness or deformity. Heart:  Regular rate and rhythm, S1, S2 normal   Abdomen:   Soft, non-tender. Bowel sounds normal. No masses,  No organomegaly. Extremities: Extremities normal, atraumatic, no cyanosis or edema. Pulses: 2+ and symmetric all extremities. Skin: Skin color, texture, turgor normal. No rashes or lesions   Neurologic: CNII-XII intact. EKG:  nsr    Data Review:     Recent Days:  Recent Labs     02/16/23  1830   WBC 7.1   HGB 13.6   HCT 41.2        Recent Labs     02/16/23  1830      K 4.0   CL 99   CO2 31   *   BUN 11   CREA 0.92   CA 8.7   ALB 3.4*   ALT 18     No results for input(s): PH, PCO2, PO2, HCO3, FIO2 in the last 72 hours.     24 Hour Results:  Recent Results (from the past 24 hour(s))   GLUCOSE, POC    Collection Time: 02/16/23  6:17 PM   Result Value Ref Range    Glucose (POC) 151 (H) 65 - 117 mg/dL    Performed by Mary WALLS    CBC WITH AUTOMATED DIFF Collection Time: 02/16/23  6:30 PM   Result Value Ref Range    WBC 7.1 4.1 - 11.1 K/uL    RBC 4.76 4.10 - 5.70 M/uL    HGB 13.6 12.1 - 17.0 g/dL    HCT 41.2 36.6 - 50.3 %    MCV 86.6 80.0 - 99.0 FL    MCH 28.6 26.0 - 34.0 PG    MCHC 33.0 30.0 - 36.5 g/dL    RDW 13.6 11.5 - 14.5 %    PLATELET 801 868 - 310 K/uL    MPV 10.7 8.9 - 12.9 FL    NRBC 0.0 0  WBC    ABSOLUTE NRBC 0.00 0.00 - 0.01 K/uL    NEUTROPHILS 63 32 - 75 %    LYMPHOCYTES 27 12 - 49 %    MONOCYTES 7 5 - 13 %    EOSINOPHILS 3 0 - 7 %    BASOPHILS 0 0 - 1 %    IMMATURE GRANULOCYTES 0 0.0 - 0.5 %    ABS. NEUTROPHILS 4.4 1.8 - 8.0 K/UL    ABS. LYMPHOCYTES 1.9 0.8 - 3.5 K/UL    ABS. MONOCYTES 0.5 0.0 - 1.0 K/UL    ABS. EOSINOPHILS 0.2 0.0 - 0.4 K/UL    ABS. BASOPHILS 0.0 0.0 - 0.1 K/UL    ABS. IMM. GRANS. 0.0 0.00 - 0.04 K/UL    DF AUTOMATED     METABOLIC PANEL, COMPREHENSIVE    Collection Time: 02/16/23  6:30 PM   Result Value Ref Range    Sodium 136 136 - 145 mmol/L    Potassium 4.0 3.5 - 5.1 mmol/L    Chloride 99 97 - 108 mmol/L    CO2 31 21 - 32 mmol/L    Anion gap 6 5 - 15 mmol/L    Glucose 143 (H) 65 - 100 mg/dL    BUN 11 6 - 20 MG/DL    Creatinine 0.92 0.70 - 1.30 MG/DL    BUN/Creatinine ratio 12 12 - 20      eGFR >60 >60 ml/min/1.73m2    Calcium 8.7 8.5 - 10.1 MG/DL    Bilirubin, total 0.3 0.2 - 1.0 MG/DL    ALT (SGPT) 18 12 - 78 U/L    AST (SGOT) 17 15 - 37 U/L    Alk.  phosphatase 54 45 - 117 U/L    Protein, total 8.2 6.4 - 8.2 g/dL    Albumin 3.4 (L) 3.5 - 5.0 g/dL    Globulin 4.8 (H) 2.0 - 4.0 g/dL    A-G Ratio 0.7 (L) 1.1 - 2.2     TSH 3RD GENERATION    Collection Time: 02/16/23  6:30 PM   Result Value Ref Range    TSH 1.31 0.36 - 3.74 uIU/mL   AMMONIA    Collection Time: 02/16/23  6:30 PM   Result Value Ref Range    Ammonia, plasma 22 <32 UMOL/L   ETHYL ALCOHOL    Collection Time: 02/16/23  6:30 PM   Result Value Ref Range    ALCOHOL(ETHYL),SERUM <10 <10 MG/DL   GLUCOSE, POC    Collection Time: 02/16/23  6:30 PM   Result Value Ref Range Glucose (POC) 138 (H) 65 - 117 mg/dL    Performed by Costa Tirado EDT    BLOOD GAS, VENOUS    Collection Time: 02/16/23  6:33 PM   Result Value Ref Range    VENOUS PH 7.37 7.32 - 7.42      VENOUS PCO2 44.9 41 - 51 mmHg    VENOUS PO2 36 25 - 40 mmHg    VENOUS BICARBONATE 25 23 - 28 mmol/L    VENOUS BASE DEFICIT 0.2 mmol/L    VENOUS O2 SATURATION 68 65 - 88 %    O2 METHOD ROOM AIR      Sample source VENOUS BLOOD      SITE OTHER     POC CHEM8    Collection Time: 02/16/23  6:48 PM   Result Value Ref Range    CO2, POC 28 (H) 19 - 24 MMOL/L    Glucose,  (H) 74 - 106 MG/DL    Creatinine, POC 0.7 0.6 - 1.3 MG/DL    eGFR (POC) >60 >60 ml/min/1.73m2    Sodium,  136 - 145 MMOL/L    Potassium, POC 3.8 3.5 - 5.5 MMOL/L    Calcium, ionized (POC) 1.16 1.12 - 1.32 mmol/L    Chloride,  100 - 108 MMOL/L    Anion gap, POC 10 10 - 20     POC LACTIC ACID    Collection Time: 02/16/23  6:48 PM   Result Value Ref Range    Lactic Acid (POC) 1.61 0.40 - 2.00 mmol/L   EKG, 12 LEAD, INITIAL    Collection Time: 02/16/23  7:22 PM   Result Value Ref Range    Ventricular Rate 66 BPM    Atrial Rate 66 BPM    P-R Interval 190 ms    QRS Duration 94 ms    Q-T Interval 428 ms    QTC Calculation (Bezet) 448 ms    Calculated P Axis 53 degrees    Calculated R Axis 14 degrees    Calculated T Axis 48 degrees    Diagnosis       Sinus rhythm with occasional premature ventricular complexes  Nonspecific ST and T wave abnormality  No previous ECGs available     URINALYSIS W/ REFLEX CULTURE    Collection Time: 02/16/23  7:44 PM    Specimen: Urine   Result Value Ref Range    Color YELLOW/STRAW      Appearance CLOUDY (A) CLEAR      Specific gravity 1.015      pH (UA) 5.5 5.0 - 8.0      Protein Negative NEG mg/dL    Glucose Negative NEG mg/dL    Ketone Negative NEG mg/dL    Bilirubin Negative NEG      Blood TRACE (A) NEG      Urobilinogen 1.0 0.2 - 1.0 EU/dL    Nitrites Positive (A) NEG      Leukocyte Esterase TRACE (A) NEG WBC 0-4 0 - 4 /hpf    RBC 0-5 0 - 5 /hpf    Epithelial cells FEW FEW /lpf    Bacteria 4+ (A) NEG /hpf    UA:UC IF INDICATED CULTURE NOT INDICATED BY UA RESULT CNI     DRUG SCREEN, URINE    Collection Time: 02/16/23  7:44 PM   Result Value Ref Range    AMPHETAMINES Negative NEG      BARBITURATES Negative NEG      BENZODIAZEPINES Negative NEG      COCAINE Positive (A) NEG      METHADONE Negative NEG      OPIATES Negative NEG      PCP(PHENCYCLIDINE) Negative NEG      THC (TH-CANNABINOL) Negative NEG      Drug screen comment (NOTE)          Imaging:     Assessment:     Marielos Abraham is a 66 y.o. male with history of noninsulin-dependent type 2 diabetes with diabetic neuropathy, chronic transportation, BPH, hypertension who is admitted for altered mental status/encephalopathy. Plan:        Altered mental status /encephalopathy  -Extensive ED work-up: Acute cystitis, cocaine positive UDS  -CT head is unremarkable  -Obtain chest x-ray, troponin  -Broaden antibiotics to cefepime  -Volume resuscitation  -N.p.o. until mentation at baseline  -Monitor mentation for improvement    NIDDM II with diabetic neuropathy  -SSI +Hypoglycemic protocols  -Continue Lyrica    Hypertension  -Continue home lisinopril    BPH  -Home Flomax    Cocaine use  -Counseled on cessation once fully awake    Obesity  -Counseled on weight loss, dieting and exercise          FEN/GI -  [email or web address]  Activity - as trolerated  DVT prophylaxis - scds  GI prophylaxis -  NI  Disposition - Home    CODE STATUS:   full code       Signed By: Bianka Mallory MD     February 17, 2023

## 2023-02-17 NOTE — PROGRESS NOTES
[facility] 66 y.o. male    Change of shift. Jason Avery RN has received report from [employer] [facility], the off going nurse. Report has included, Demographics, Chief complaint, SBAR, kardex, most recent lab results, MAR and plan of care. All questions and concerns have been answered.

## 2023-02-17 NOTE — ED NOTES
PT not responsive to this RN at this time; snoring loudly and not arousable to voice.  Will continue to monitor

## 2023-02-17 NOTE — ED NOTES
Verbal shift change report given to Alpa Aceves RN (oncoming nurse) by Mara Dela Cruz RN   (offgoing nurse). Report included the following information SBAR, Kardex, ED Summary, [facility] and Recent Results.

## 2023-02-17 NOTE — ED NOTES
Pt resting in bed comfortably; linen/ chucks changed; pt cleaned. Will continue to monitor.  Side rails x 2 up

## 2023-02-17 NOTE — PROGRESS NOTES
Reason for Admission:   Pt presents to ED complaining of altered mental status. EMS states that pt was found outside by bystanders and that he had vomit on him. EMS stated that pt has hx of lower spinal cord injury and diabetes, and on scene, pt's blood sugar was >600. Pt has pinpoint pupils and arrives to ED snoring and unable to verbalize. Pt is alert and oriented x 0, RR even and unlabored, skin is warm and dry. Assessment completed and pt updated on plan of care. Call bell in reach. RUR Score:     9%             PCP: First and Last name:   [facility] @ the [address] [place] [address]     [facility]   Are you a current patient: Yes/No: Yes   Approximate date of last visit:    Can you participate in a virtual visit if needed:     Do you (patient/family) have any concerns for transition/discharge? Plan for utilizing home health:   None at this time. Patient has a personal aid. Current Advanced Directive/Advance Care Plan:  Full CODE decision maker      Healthcare Decision Maker:   Click here to complete [address] including selection of the Healthcare Decision Maker Relationship (ie \"Primary\")              Transition of Care Plan:    CM opened case for assessment of D/C planning needs, CM reviewed chart. CM completed assessment with pt at bedside. Pt is alert and oriented throughout encounter. CM introduced self/role, verified demographics, and discussed discharge planning. At bedside is Ms. Charla Solitario [phone] (paid caretaker), Ms. Christine Schaffer ( wife). -Lives alone  -Has Medicare Part A  -Patient is a  all services are with the [address] [place] [address] through the Spinal cord clinic.  -has a personal aid with [employer] Ms. Charla Solitario [phone]  -Has DME motorized wheelchair, shower chair, and CPAP machine.  -Per Ms. Charla Solitario patient would benefit with a Bedside commode   -Irais TRUONG at the [address] [place] [address] will contact for bedside commode   -ESTEPHANIE called the [address] [place] [address] [phone] patient  is not Ms. Lisbeth Levi Rajinder ext. [phone] is patient outpatient SW. Cm left VM in reference to patient Bedside commode.     -will need 2nd [facility] notification.     [address] [facility], [address] [phone]

## 2023-02-17 NOTE — PROGRESS NOTES
Problem: Patient Education: Go to Patient Education Activity  Goal: Patient/Family Education  Outcome: Resolved/Met   PHYSICAL THERAPY EVALUATION/DISCHARGE  Patient: Faye Wang (61 y.o. male)  Date: 2/17/2023  Primary Diagnosis: Toxic metabolic encephalopathy [I22.0]       Precautions:  Fall (pt is w/c dependant)      ASSESSMENT  Based on the objective data described below, the patient presents slightly below baseline mobility. Patient utilizes wheelchair for all mobility tasks and has home health aides 7hrs/day to assist with ADLs. Patient and his family report that he lives in 100% accessible home and that family will be able to help supervise patient once he returns home. PT recommends HHPT upon discharge to assist patient return to his baseline level of function including being able to perform bed <-> chair and stand pivot transfers with modified independence. Other factors to consider for discharge: medical stability      Further skilled acute physical therapy is not indicated at this time. PLAN :  Recommendation for discharge: (in order for the patient to meet his/her long term goals)  Physical therapy at least 2 days/week in the home AND ensure assist and/or supervision for safety with mobility    This discharge recommendation:  Has been made in collaboration with the attending provider and/or case management    IF patient discharges home will need the following DME: patient owns DME required for discharge       SUBJECTIVE:   Patient stated I don't know what happened I just passed out at home.     OBJECTIVE DATA SUMMARY:   HISTORY:    History reviewed. No pertinent past medical history. History reviewed. No pertinent surgical history.     Prior level of function: modified independent, utilizes wheelchair for all mobility tasks  Personal factors and/or comorbidities impacting plan of care:     Home Situation  Home Environment: Private residence  # Steps to Enter: 0  Wheelchair Ramp: Yes  One/Two Story Residence: One story  Living Alone: Yes  Support Systems: Child([place]), Caregiver/Home Care Staff (aides 7 hours daily; daughter, friend)  Patient Expects to be Discharged to[de-identified] Home with family assistance  Current DME Used/Available at Home: Wheelchair, power, Wheelchair, Grab bars, Shower chair, Hospital bed  Tub or Shower Type: Shower    EXAMINATION/PRESENTATION/DECISION MAKING:   Critical Behavior:  Neurologic State: Alert  Orientation Level: Oriented to person, Oriented to place, Oriented to time, Disoriented to situation  Cognition: Appropriate for age attention/concentration, Follows commands  Safety/Judgement: Awareness of environment, Fall prevention, Decreased awareness of need for safety, Decreased insight into deficits  Range Of Motion:  AROM: Generally decreased, functional  PROM: Generally decreased, functional  Strength:    Strength: Generally decreased, functional  Tone & Sensation:   Tone: Abnormal  Sensation: Impaired  Coordination:  Coordination: Generally decreased, functional  Functional Mobility:  Bed Mobility:  Rolling: Supervision; Additional time (bed rail)  Supine to Sit: Contact guard assistance  Sit to Supine: Contact guard assistance  Scooting: Stand-by assistance; Additional time  Transfers:  Sit to Stand: Minimum assistance; Additional time;Assist x1  Stand to Sit: Contact guard assistance; Additional time;Assist x1  Stand Pivot Transfers: Contact guard assistance;Minimum assistance     Bed to Chair: Minimum assistance; Additional time;Assist x1 (stand pivot transfer)  Balance:   Sitting: Intact  Standing: Impaired; With support (1 major LOB he wasn't able to self correct)  Standing - Static: Fair;Constant support  Standing - Dynamic : Fair;Constant support     Based on the above components, the patient evaluation is determined to be of the following complexity level: LOW     Pain Rating:  No pain reported    Activity Tolerance:   Good      After treatment patient left in no apparent distress:   Supine in bed, Call bell within reach, Caregiver / family present, and Side rails x 3    COMMUNICATION/EDUCATION:   The patients plan of care was discussed with: Physical therapist, Occupational therapist, Registered nurse, and Case management. Fall prevention education was provided and the patient/caregiver indicated understanding., Patient/family have participated as able in goal setting and plan of care. , and Patient/family agree to work toward stated goals and plan of care.     Thank you for this referral.  Hossein Juan, PT   Time Calculation: 26 mins

## 2023-02-17 NOTE — ROUTINE PROCESS
TRANSFER - IN REPORT:    Verbal report received from [facility]  being received from ED 13 for routine progression of care      Report consisted of patients Situation, Background, Assessment and   Recommendations(SBAR). Information from the following report(s) SBAR, Kardex, MAR, Recent Results, and Cardiac Rhythm NSR  was reviewed with the receiving nurse. Opportunity for questions and clarification was provided. Assessment completed upon patients arrival to unit and care assumed. Patient will be transferred to [facility] at the change of shift and report will be passed on to the [facility].

## 2023-02-18 LAB
ACC. NO. FROM MICRO ORDER, ACCP: ABNORMAL
ACINETOBACTER CALCOACETICUS-BAUMANII COMPLEX, ACBCX: NOT DETECTED
B FRAGILIS DNA BLD POS QL NAA+NON-PROBE: NOT DETECTED
BIOFIRE COMMENT, BCIDPF: ABNORMAL
C ALBICANS DNA BLD POS QL NAA+NON-PROBE: NOT DETECTED
C AURIS DNA BLD POS QL NAA+NON-PROBE: NOT DETECTED
C GATTII+NEOFOR DNA BLD POS QL NAA+N-PRB: NOT DETECTED
C GLABRATA DNA BLD POS QL NAA+NON-PROBE: NOT DETECTED
C KRUSEI DNA BLD POS QL NAA+NON-PROBE: NOT DETECTED
C PARAP DNA BLD POS QL NAA+NON-PROBE: NOT DETECTED
C TROPICLS DNA BLD POS QL NAA+NON-PROBE: NOT DETECTED
E CLOAC COMP DNA BLD POS NAA+NON-PROBE: NOT DETECTED
E COLI DNA BLD POS QL NAA+NON-PROBE: NOT DETECTED
E FAECALIS DNA BLD POS QL NAA+NON-PROBE: NOT DETECTED
E FAECIUM DNA BLD POS QL NAA+NON-PROBE: NOT DETECTED
ENTEROBACTERALES DNA BLD POS NAA+N-PRB: NOT DETECTED
GLUCOSE BLD STRIP.AUTO-MCNC: 115 MG/DL (ref 65–117)
GLUCOSE BLD STRIP.AUTO-MCNC: 122 MG/DL (ref 65–117)
GLUCOSE BLD STRIP.AUTO-MCNC: 138 MG/DL (ref 65–117)
GLUCOSE BLD STRIP.AUTO-MCNC: 99 MG/DL (ref 65–117)
GP B STREP DNA BLD POS QL NAA+NON-PROBE: NOT DETECTED
HAEM INFLU DNA BLD POS QL NAA+NON-PROBE: NOT DETECTED
K OXYTOCA DNA BLD POS QL NAA+NON-PROBE: NOT DETECTED
KLEBSIELLA SP DNA BLD POS QL NAA+NON-PRB: NOT DETECTED
KLEBSIELLA SP DNA BLD POS QL NAA+NON-PRB: NOT DETECTED
L MONOCYTOG DNA BLD POS QL NAA+NON-PROBE: NOT DETECTED
MECA+MECC ISLT/SPM QL: NOT DETECTED
N MEN DNA BLD POS QL NAA+NON-PROBE: NOT DETECTED
P AERUGINOSA DNA BLD POS NAA+NON-PROBE: NOT DETECTED
PROTEUS SP DNA BLD POS QL NAA+NON-PROBE: NOT DETECTED
RESISTANT GENE SPACE, REGENE: ABNORMAL
S AUREUS DNA BLD POS QL NAA+NON-PROBE: NOT DETECTED
S AUREUS+CONS DNA BLD POS NAA+NON-PROBE: DETECTED
S EPIDERMIDIS DNA BLD POS QL NAA+NON-PRB: NOT DETECTED
S LUGDUNENSIS DNA BLD POS QL NAA+NON-PRB: DETECTED
S MALTOPHILIA DNA BLD POS QL NAA+NON-PRB: NOT DETECTED
S MARCESCENS DNA BLD POS NAA+NON-PROBE: NOT DETECTED
S PNEUM DNA BLD POS QL NAA+NON-PROBE: NOT DETECTED
S PYO DNA BLD POS QL NAA+NON-PROBE: NOT DETECTED
SALMONELLA DNA BLD POS QL NAA+NON-PROBE: NOT DETECTED
SERVICE CMNT-IMP: ABNORMAL
SERVICE CMNT-IMP: ABNORMAL
SERVICE CMNT-IMP: NORMAL
SERVICE CMNT-IMP: NORMAL
STREPTOCOCCUS DNA BLD POS NAA+NON-PROBE: NOT DETECTED

## 2023-02-18 PROCEDURE — 74011250637 HC RX REV CODE- 250/637: Performed by: STUDENT IN AN ORGANIZED HEALTH CARE EDUCATION/TRAINING PROGRAM

## 2023-02-18 PROCEDURE — 74011250637 HC RX REV CODE- 250/637: Performed by: INTERNAL MEDICINE

## 2023-02-18 PROCEDURE — 65270000032 HC RM SEMIPRIVATE

## 2023-02-18 PROCEDURE — 74011250636 HC RX REV CODE- 250/636: Performed by: STUDENT IN AN ORGANIZED HEALTH CARE EDUCATION/TRAINING PROGRAM

## 2023-02-18 PROCEDURE — 74011000250 HC RX REV CODE- 250: Performed by: STUDENT IN AN ORGANIZED HEALTH CARE EDUCATION/TRAINING PROGRAM

## 2023-02-18 PROCEDURE — 82962 GLUCOSE BLOOD TEST: CPT

## 2023-02-18 PROCEDURE — 74011000258 HC RX REV CODE- 258: Performed by: STUDENT IN AN ORGANIZED HEALTH CARE EDUCATION/TRAINING PROGRAM

## 2023-02-18 RX ORDER — LISINOPRIL 5 MG/1
2.5 TABLET ORAL DAILY
Status: DISCONTINUED | OUTPATIENT
Start: 2023-02-19 | End: 2023-02-19 | Stop reason: HOSPADM

## 2023-02-18 RX ADMIN — SODIUM CHLORIDE, PRESERVATIVE FREE 10 ML: 5 INJECTION INTRAVENOUS at 18:05

## 2023-02-18 RX ADMIN — CEFEPIME 2 G: 2 INJECTION, POWDER, FOR SOLUTION INTRAVENOUS at 09:32

## 2023-02-18 RX ADMIN — MESALAMINE 800 MG: 400 CAPSULE, DELAYED RELEASE ORAL at 21:50

## 2023-02-18 RX ADMIN — ROSUVASTATIN CALCIUM 40 MG: 10 TABLET, FILM COATED ORAL at 09:32

## 2023-02-18 RX ADMIN — FLUTICASONE PROPIONATE 2 SPRAY: 50 SPRAY, METERED NASAL at 18:05

## 2023-02-18 RX ADMIN — CEFEPIME 2 G: 2 INJECTION, POWDER, FOR SOLUTION INTRAVENOUS at 18:05

## 2023-02-18 RX ADMIN — DOCUSATE SODIUM 100 MG: 100 CAPSULE, LIQUID FILLED ORAL at 18:05

## 2023-02-18 RX ADMIN — CETIRIZINE HYDROCHLORIDE 10 MG: 10 TABLET, FILM COATED ORAL at 09:32

## 2023-02-18 RX ADMIN — SENNOSIDES 8.6 MG: 8.6 TABLET, FILM COATED ORAL at 21:49

## 2023-02-18 RX ADMIN — LISINOPRIL 5 MG: 5 TABLET ORAL at 09:32

## 2023-02-18 RX ADMIN — DOCUSATE SODIUM 100 MG: 100 CAPSULE, LIQUID FILLED ORAL at 09:32

## 2023-02-18 RX ADMIN — ASPIRIN 81 MG CHEWABLE TABLET 81 MG: 81 TABLET CHEWABLE at 09:32

## 2023-02-18 RX ADMIN — BACLOFEN 20 MG: 10 TABLET ORAL at 09:32

## 2023-02-18 RX ADMIN — MESALAMINE 800 MG: 400 CAPSULE, DELAYED RELEASE ORAL at 18:05

## 2023-02-18 RX ADMIN — PREGABALIN 150 MG: 75 CAPSULE ORAL at 21:50

## 2023-02-18 RX ADMIN — TAMSULOSIN HYDROCHLORIDE 0.4 MG: 0.4 CAPSULE ORAL at 09:32

## 2023-02-18 RX ADMIN — SODIUM CHLORIDE, PRESERVATIVE FREE 10 ML: 5 INJECTION INTRAVENOUS at 21:50

## 2023-02-18 RX ADMIN — MESALAMINE 800 MG: 400 CAPSULE, DELAYED RELEASE ORAL at 09:32

## 2023-02-18 RX ADMIN — CEFEPIME 2 G: 2 INJECTION, POWDER, FOR SOLUTION INTRAVENOUS at 00:58

## 2023-02-18 NOTE — PROGRESS NOTES
Received call from lab stating that patient's blood culture taken from left arm tested + for gram + cocci and clusters. 1 of 4 bottles +. Notified Dr. Claire Valerio via [phone] of results. Primary nurse, Dany Phalen, RN, informed verbally.

## 2023-02-18 NOTE — PROGRESS NOTES
0730  Shift change report received from [facility]. Report included review of SBAR, accordion report, results review, orders, meds, ROS, and POC. Pt requesting to be transferred to the [place] spinal chord unit, this RN to d/w with hospitalist.  All questions answered. Transfer of care complete. [id number]  This RN informed by Kimberly Gilford that critical result call received for positive blood culture taken from left arm tested + for gram + cocci and clusters. 1 of 4 bottles +. Hospitalist notified via perfect serve by Kimberly Gilford and MD acknowledged. See flowsheet for critical results documentation.

## 2023-02-18 NOTE — PROGRESS NOTES
Pharmacy Therapeutic Interchange Note:    Mesalamine 800 mg TID (Delzicol) was therapeutically interchanged for Mesalamine ER 1.5 g total dose daily (Apriso) per the P&T Committee approved Nicholas Buck.      Felicita Platt, [facility], Pharmacist  2/17/2023 8:49 PM

## 2023-02-18 NOTE — PROGRESS NOTES
[address] [facility] Adult  Hospitalist Group                                                                                          Hospitalist Progress Note  Yajaira Hammer MD  Answering service: [phone] OR [phone] from in house phone        Date of Service:  2023  NAME:  Avinash Morgan  :  1945  MRN:  [id number]   Room: [place] [address]    Admission Summary:   HPI: Avinash Morgan is a 66 y.o. 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 History is obtained from ED staff and chart review as family's not available at bedside for additional history. Per chart review EMS was called for concerns of hypoglycemia with reported blood sugar readings greater than 600. However blood glucoses 130 in ED presentation. He is noted to be somnolent but arousable at time of my evaluation, he is still somnolent but arousable and answers to yes or no questions. Remarkable vitals on ER Presentation: vss  Labs Remarkable for: Glucose 143, urinalysis with cloudy urine with positive nitrites, leukocyte esterase and 4+ bacteria UDS positive for cocaine  ER Images: CT head       Interval history / Subjective: Follow up the patient admitted for evaluation of Acute encephalopathy  NAD,   AO x3, slow  No neuro deficit  ROM intact  BG controlled  Reviewed ECG independently, NSR  Reviewed labs     Assessment & Plan:      Altered mental status /encephalopathy possible toxic and methabolic, resolved  -Extensive ED work-up: Acute cystitis, cocaine positive UDS  -CT head reviewed and  unremarkable  No neuro deficit  AO x3  -chest x-ray possible pulmonary edema   Troponin low 14  D/c fluids IV  -Continue antibiotics cefepime  Follow up BC and UAA c/s  Able to swallow  Diet was initiated  PT/OT/ST  Pt use WC at home    UTI  Continue abx Cefepime  BC pending  UA pending       NIDDM II with diabetic neuropathy  -SSI +Hypoglycemic protocols  -Continue Lyrica  Hb A1C 6.5     Hypertension, controlled  -Continue home lisinopril     BPH  -Home Flomax     Cocaine use  -Counseled on cessation once fully awake  The patient denies illicit drug use     Obesity  -Counseled on weight loss, dieting and exercise    Poor mobility, unsteady gait  WC bounded        Code status: Full Code  Prophylaxis: SCD  Care Plan discussed with: patient, RN, CM  Anticipated Disposition: TBD, pending clinical improvement  Inpatient  Cardiac monitoring: Telemetry NSR,      Hospital Problems  Never Reviewed            Codes Class Noted POA    Toxic metabolic encephalopathy PTA-61-AO: G92.8  ICD-9-CM: 349.82  2/17/2023 Unknown           Social Determinants of Health     Tobacco Use: Not on file   Alcohol Use: Not on file   Financial Resource Strain: Not on file   Food Insecurity: Not on file   Transportation Needs: Not on file   Physical Activity: Not on file   Stress: Not on file   Social Connections: Not on file   Intimate Partner Violence: Not on file   Depression: Not on file   Housing Stability: Not on file       Review of Systems:   A comprehensive review of systems was negative except for that written in the HPI. Vital Signs:    Last 24hrs VS reviewed since prior progress note.  Most recent are:  Visit Vitals  BP (!) 123/58 (BP 1 Location: Right upper arm, BP Patient Position: At rest)   Pulse 64   Temp 97 °F (36.1 °C)   Resp 16   Ht 5' 10\" (1.778 m)   Wt 127 kg (280 lb)   SpO2 100%   BMI 40.18 kg/m²         Intake/Output Summary (Last 24 hours) at 2/17/2023 1912  Last data filed at 2/17/2023 1145  Gross per 24 hour   Intake 2257.5 ml   Output 800 ml   Net 1457.5 ml        Physical Examination:     I had a face to face encounter with this patient and independently examined them on 2/17/2023 as outlined below:          General : alert x 3, awake, no acute distress, slow  HEENT: PEERL, EOMI, moist mucus membrane, TM clear  Neck: supple, no JVD, no meningeal signs  Chest: Coarse to auscultation bilaterally   CVS: S1 S2 heard, Capillary refill less than 2 seconds  Abd: soft/ non tender, non distended, BS physiological, obese  Ext: no clubbing, no cyanosis, no edema, brisk 2+ DP pulses  Neuro/Psych: pleasant mood and affect, CN 2-12 grossly intact, sensory grossly within normal limit, Strength 5/5 in all extremities, DTR 1+ x 4, gait was not assessed  Skin: warm     Data Review:    Review and/or order of clinical lab test    CT head:  IMPRESSION  No acute intracranial hemorrhage or infarct, allowing for limitation due to  motion degraded images. CXR: Impression: Cardiomegaly with mild interstitial edema. I have personally and independently reviewed all pertinent labs, diagnostic studies, imaging, and have provided independent interpretation of the same. Labs:     Recent Labs     02/16/23  1830   WBC 7.1   HGB 13.6   HCT 41.2        Recent Labs     02/16/23  1830      K 4.0   CL 99   CO2 31   BUN 11   CREA 0.92   *   CA 8.7     Recent Labs     02/16/23  1830   ALT 18   AP 54   TBILI 0.3   TP 8.2   ALB 3.4*   GLOB 4.8*     No results for input(s): INR, PTP, APTT, INREXT in the last 72 hours. No results for input(s): FE, TIBC, PSAT, FERR in the last 72 hours. No results found for: FOL, RBCF   No results for input(s): PH, PCO2, PO2 in the last 72 hours. No results for input(s): CPK, CKNDX, TROIQ in the last 72 hours.     No lab exists for component: CPKMB  No results found for: CHOL, CHOLX, CHLST, CHOLV, HDL, HDLP, LDL, LDLC, DLDLP, TGLX, TRIGL, TRIGP, CHHD, CHHDX  Lab Results   Component Value Date/Time    Glucose (POC) 133 (H) 02/17/2023 04:23 PM    Glucose (POC) 117 02/17/2023 12:07 PM    Glucose (POC) 88 02/17/2023 06:36 AM    Glucose (POC) 178 (H) 02/17/2023 01:23 AM    Glucose (POC) 138 (H) 02/16/2023 06:30 PM    Glucose,  (H) 02/16/2023 06:48 PM     Lab Results   Component Value Date/Time    Color YELLOW/STRAW 02/16/2023 07:44 PM    Appearance CLOUDY (A) 02/16/2023 07:44 PM    Specific gravity 1.015 02/16/2023 07:44 PM    pH (UA) 5.5 02/16/2023 07:44 PM    Protein Negative 02/16/2023 07:44 PM    Glucose Negative 02/16/2023 07:44 PM    Ketone Negative 02/16/2023 07:44 PM    Bilirubin Negative 02/16/2023 07:44 PM    Urobilinogen 1.0 02/16/2023 07:44 PM    Nitrites Positive (A) 02/16/2023 07:44 PM    Leukocyte Esterase TRACE (A) 02/16/2023 07:44 PM    Epithelial cells FEW 02/16/2023 07:44 PM    Bacteria 4+ (A) 02/16/2023 07:44 PM    WBC 0-4 02/16/2023 07:44 PM    RBC 0-5 02/16/2023 07:44 PM       Notes reviewed from all clinical/nonclinical/nursing services involved in patient's clinical care. Care coordination discussions were held with appropriate clinical/nonclinical/ nursing providers based on care coordination needs. Patients current active Medications were reviewed, considered, added and adjusted based on the clinical condition today. Home Medications were reconciled to the best of my ability given all available resources at the time of admission. Route is PO if not otherwise noted.       Admission Status:[id number]:::1}      Medications Reviewed:     Current Facility-Administered Medications   Medication Dose Route Frequency    aspirin chewable tablet 81 mg  81 mg Oral DAILY    docusate sodium (COLACE) capsule 100 mg  100 mg Oral BID    lisinopriL (PRINIVIL, ZESTRIL) tablet 5 mg  5 mg Oral DAILY    pregabalin (LYRICA) capsule 75 mg  75 mg Oral BID    tamsulosin (FLOMAX) capsule 0.4 mg  0.4 mg Oral DAILY    sodium chloride (NS) flush 5-40 mL  5-40 mL IntraVENous Q8H    sodium chloride (NS) flush 5-40 mL  5-40 mL IntraVENous PRN    acetaminophen (TYLENOL) tablet 650 mg  650 mg Oral Q6H PRN    Or    acetaminophen (TYLENOL) suppository 650 mg  650 mg Rectal Q6H PRN    polyethylene glycol (MIRALAX) packet 17 g  17 g Oral DAILY PRN ondansetron (ZOFRAN ODT) tablet 4 mg  4 mg Oral Q8H PRN    Or    ondansetron (ZOFRAN) injection 4 mg  4 mg IntraVENous Q6H PRN    glucose chewable tablet 16 g  4 Tablet Oral PRN    glucagon (GLUCAGEN) injection 1 mg  1 mg IntraMUSCular PRN    dextrose 10% infusion 0-250 mL  0-250 mL IntraVENous PRN    0.9% sodium chloride infusion  150 mL/hr IntraVENous CONTINUOUS    insulin lispro (HUMALOG) injection   SubCUTAneous Q6H    cefepime (MAXIPIME) 2 g in 0.9% sodium chloride (MBP/ADV) 100 mL MBP  2 g IntraVENous Q8H     ______________________________________________________________________  EXPECTED LENGTH OF STAY: 3d 9h  ACTUAL LENGTH OF STAY:          0                 Emma Savage MD
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
## 2023-02-18 NOTE — PROGRESS NOTES
Car plan reviewed. Problem: Aspiration - Risk of  Goal: *Absence of aspiration  Outcome: Progressing Towards Goal     Problem: Patient Education: Go to Patient Education Activity  Goal: Patient/Family Education  Outcome: Progressing Towards Goal     Problem: Patient Education: Go to Patient Education Activity  Goal: Patient/Family Education  Outcome: Progressing Towards Goal     Problem: Diabetes Self-Management  Goal: *Disease process and treatment process  Description: Define diabetes and identify own type of diabetes; list 3 options for treating diabetes. Outcome: Progressing Towards Goal  Goal: *Incorporating nutritional management into lifestyle  Description: Describe effect of type, amount and timing of food on blood glucose; list 3 methods for planning meals. Outcome: Progressing Towards Goal  Goal: *Incorporating physical activity into lifestyle  Description: State effect of exercise on blood glucose levels. Outcome: Progressing Towards Goal  Goal: *Developing strategies to promote health/change behavior  Description: Define the ABC's of diabetes; identify appropriate screenings, schedule and personal plan for screenings. Outcome: Progressing Towards Goal  Goal: *Using medications safely  Description: State effect of diabetes medications on diabetes; name diabetes medication taking, action and side effects. Outcome: Progressing Towards Goal  Goal: *Monitoring blood glucose, interpreting and using results  Description: Identify recommended blood glucose targets  and personal targets. Outcome: Progressing Towards Goal  Goal: *Prevention, detection, treatment of acute complications  Description: List symptoms of hyper- and hypoglycemia; describe how to treat low blood sugar and actions for lowering  high blood glucose level.   Outcome: Progressing Towards Goal  Goal: *Prevention, detection and treatment of chronic complications  Description: Define the natural course of diabetes and describe the relationship of blood glucose levels to long term complications of diabetes. Outcome: Progressing Towards Goal  Goal: *Developing strategies to address psychosocial issues  Description: Describe feelings about living with diabetes; identify support needed and support network  Outcome: Progressing Towards Goal  Goal: *Insulin pump training  Outcome: Progressing Towards Goal  Goal: *Sick day guidelines  Outcome: Progressing Towards Goal  Goal: *Patient Specific Goal (EDIT GOAL, INSERT TEXT)  Outcome: Progressing Towards Goal     Problem: Patient Education: Go to Patient Education Activity  Goal: Patient/Family Education  Outcome: Progressing Towards Goal     Problem: Falls - Risk of  Goal: *Absence of Falls  Description: Document Michelle Fall Risk and appropriate interventions in the flowsheet.   Outcome: Progressing Towards Goal  Note: Fall Risk Interventions:                                Problem: Patient Education: Go to Patient Education Activity  Goal: Patient/Family Education  Outcome: Progressing Towards Goal

## 2023-02-18 NOTE — PROGRESS NOTES
1925: Bedside and Verbal shift change report given to Vaishnavi Ross RN (oncoming nurse) by Jamie Vela RN (offgoing nurse). Report included the following information SBAR, Kardex, MAR, and Recent Results. 0730: Bedside and Verbal shift change report given to Abhinav Tapia RN (oncoming nurse) by Vaishnavi Ross RN (offgoing nurse). Report included the following information SBAR, Kardex, MAR, and Recent Results.

## 2023-02-19 VITALS
BODY MASS INDEX: 40.09 KG/M2 | RESPIRATION RATE: 18 BRPM | WEIGHT: 280 LBS | HEART RATE: 58 BPM | HEIGHT: 70 IN | SYSTOLIC BLOOD PRESSURE: 118 MMHG | OXYGEN SATURATION: 100 % | DIASTOLIC BLOOD PRESSURE: 68 MMHG | TEMPERATURE: 97.2 F

## 2023-02-19 LAB
ALBUMIN SERPL-MCNC: 2.8 G/DL (ref 3.5–5)
ALBUMIN/GLOB SERPL: 0.7 (ref 1.1–2.2)
ALP SERPL-CCNC: 47 U/L (ref 45–117)
ALT SERPL-CCNC: 14 U/L (ref 12–78)
ANION GAP SERPL CALC-SCNC: 5 MMOL/L (ref 5–15)
AST SERPL-CCNC: 17 U/L (ref 15–37)
BACTERIA SPEC CULT: ABNORMAL
BACTERIA SPEC CULT: ABNORMAL
BASOPHILS # BLD: 0 K/UL (ref 0–0.1)
BASOPHILS NFR BLD: 0 % (ref 0–1)
BILIRUB SERPL-MCNC: 0.2 MG/DL (ref 0.2–1)
BUN SERPL-MCNC: 11 MG/DL (ref 6–20)
BUN/CREAT SERPL: 15 (ref 12–20)
CALCIUM SERPL-MCNC: 8.2 MG/DL (ref 8.5–10.1)
CC UR VC: ABNORMAL
CHLORIDE SERPL-SCNC: 105 MMOL/L (ref 97–108)
CO2 SERPL-SCNC: 27 MMOL/L (ref 21–32)
CREAT SERPL-MCNC: 0.71 MG/DL (ref 0.7–1.3)
DIFFERENTIAL METHOD BLD: NORMAL
EOSINOPHIL # BLD: 0.4 K/UL (ref 0–0.4)
EOSINOPHIL NFR BLD: 5 % (ref 0–7)
ERYTHROCYTE [DISTWIDTH] IN BLOOD BY AUTOMATED COUNT: 13.8 % (ref 11.5–14.5)
GLOBULIN SER CALC-MCNC: 4.2 G/DL (ref 2–4)
GLUCOSE BLD STRIP.AUTO-MCNC: 105 MG/DL (ref 65–117)
GLUCOSE BLD STRIP.AUTO-MCNC: 136 MG/DL (ref 65–117)
GLUCOSE BLD STRIP.AUTO-MCNC: 97 MG/DL (ref 65–117)
GLUCOSE SERPL-MCNC: 112 MG/DL (ref 65–100)
HCT VFR BLD AUTO: 38 % (ref 36.6–50.3)
HGB BLD-MCNC: 12.5 G/DL (ref 12.1–17)
IMM GRANULOCYTES # BLD AUTO: 0 K/UL (ref 0–0.04)
IMM GRANULOCYTES NFR BLD AUTO: 0 % (ref 0–0.5)
LYMPHOCYTES # BLD: 3.2 K/UL (ref 0.8–3.5)
LYMPHOCYTES NFR BLD: 41 % (ref 12–49)
MCH RBC QN AUTO: 28.6 PG (ref 26–34)
MCHC RBC AUTO-ENTMCNC: 32.9 G/DL (ref 30–36.5)
MCV RBC AUTO: 87 FL (ref 80–99)
MONOCYTES # BLD: 0.6 K/UL (ref 0–1)
MONOCYTES NFR BLD: 8 % (ref 5–13)
NEUTS SEG # BLD: 3.6 K/UL (ref 1.8–8)
NEUTS SEG NFR BLD: 46 % (ref 32–75)
NRBC # BLD: 0 K/UL (ref 0–0.01)
NRBC BLD-RTO: 0 PER 100 WBC
PLATELET # BLD AUTO: 247 K/UL (ref 150–400)
PMV BLD AUTO: 10.3 FL (ref 8.9–12.9)
POTASSIUM SERPL-SCNC: 4 MMOL/L (ref 3.5–5.1)
PROT SERPL-MCNC: 7 G/DL (ref 6.4–8.2)
RBC # BLD AUTO: 4.37 M/UL (ref 4.1–5.7)
SERVICE CMNT-IMP: ABNORMAL
SERVICE CMNT-IMP: ABNORMAL
SERVICE CMNT-IMP: NORMAL
SERVICE CMNT-IMP: NORMAL
SODIUM SERPL-SCNC: 137 MMOL/L (ref 136–145)
WBC # BLD AUTO: 7.8 K/UL (ref 4.1–11.1)

## 2023-02-19 PROCEDURE — 80053 COMPREHEN METABOLIC PANEL: CPT

## 2023-02-19 PROCEDURE — 85025 COMPLETE CBC W/AUTO DIFF WBC: CPT

## 2023-02-19 PROCEDURE — 74011250636 HC RX REV CODE- 250/636: Performed by: STUDENT IN AN ORGANIZED HEALTH CARE EDUCATION/TRAINING PROGRAM

## 2023-02-19 PROCEDURE — 74011000258 HC RX REV CODE- 258: Performed by: STUDENT IN AN ORGANIZED HEALTH CARE EDUCATION/TRAINING PROGRAM

## 2023-02-19 PROCEDURE — 74011250637 HC RX REV CODE- 250/637: Performed by: STUDENT IN AN ORGANIZED HEALTH CARE EDUCATION/TRAINING PROGRAM

## 2023-02-19 PROCEDURE — 74011250637 HC RX REV CODE- 250/637: Performed by: INTERNAL MEDICINE

## 2023-02-19 PROCEDURE — 82962 GLUCOSE BLOOD TEST: CPT

## 2023-02-19 PROCEDURE — 74011000250 HC RX REV CODE- 250: Performed by: STUDENT IN AN ORGANIZED HEALTH CARE EDUCATION/TRAINING PROGRAM

## 2023-02-19 RX ORDER — CIPROFLOXACIN 500 MG/1
500 TABLET ORAL 2 TIMES DAILY
Qty: 14 TABLET | Refills: 0 | Status: SHIPPED | OUTPATIENT
Start: 2023-02-19 | End: 2023-02-26

## 2023-02-19 RX ORDER — CIPROFLOXACIN 500 MG/1
500 TABLET ORAL 2 TIMES DAILY
Qty: 14 TABLET | Refills: 0 | Status: SHIPPED | OUTPATIENT
Start: 2023-02-19 | End: 2023-02-19 | Stop reason: SDUPTHER

## 2023-02-19 RX ADMIN — MESALAMINE 800 MG: 400 CAPSULE, DELAYED RELEASE ORAL at 09:14

## 2023-02-19 RX ADMIN — ROSUVASTATIN CALCIUM 40 MG: 10 TABLET, FILM COATED ORAL at 09:16

## 2023-02-19 RX ADMIN — TAMSULOSIN HYDROCHLORIDE 0.4 MG: 0.4 CAPSULE ORAL at 09:15

## 2023-02-19 RX ADMIN — DOCUSATE SODIUM 100 MG: 100 CAPSULE, LIQUID FILLED ORAL at 09:14

## 2023-02-19 RX ADMIN — CEFEPIME 2 G: 2 INJECTION, POWDER, FOR SOLUTION INTRAVENOUS at 00:31

## 2023-02-19 RX ADMIN — CEFEPIME 2 G: 2 INJECTION, POWDER, FOR SOLUTION INTRAVENOUS at 09:20

## 2023-02-19 RX ADMIN — ASPIRIN 81 MG CHEWABLE TABLET 81 MG: 81 TABLET CHEWABLE at 09:15

## 2023-02-19 RX ADMIN — LISINOPRIL 2.5 MG: 5 TABLET ORAL at 09:16

## 2023-02-19 RX ADMIN — CETIRIZINE HYDROCHLORIDE 10 MG: 10 TABLET, FILM COATED ORAL at 09:16

## 2023-02-19 RX ADMIN — SODIUM CHLORIDE, PRESERVATIVE FREE 10 ML: 5 INJECTION INTRAVENOUS at 06:23

## 2023-02-19 RX ADMIN — ACETAMINOPHEN 650 MG: 325 TABLET ORAL at 09:32

## 2023-02-19 RX ADMIN — BACLOFEN 20 MG: 10 TABLET ORAL at 09:16

## 2023-02-19 NOTE — PROGRESS NOTES
Problem: Aspiration - Risk of  Goal: *Absence of aspiration  Outcome: Progressing Towards Goal     Problem: Patient Education: Go to Patient Education Activity  Goal: Patient/Family Education  Outcome: Progressing Towards Goal     Problem: Patient Education: Go to Patient Education Activity  Goal: Patient/Family Education  Outcome: Progressing Towards Goal     Problem: Diabetes Self-Management  Goal: *Disease process and treatment process  Description: Define diabetes and identify own type of diabetes; list 3 options for treating diabetes. Outcome: Progressing Towards Goal  Goal: *Incorporating nutritional management into lifestyle  Description: Describe effect of type, amount and timing of food on blood glucose; list 3 methods for planning meals. Outcome: Progressing Towards Goal  Goal: *Incorporating physical activity into lifestyle  Description: State effect of exercise on blood glucose levels. Outcome: Progressing Towards Goal  Goal: *Developing strategies to promote health/change behavior  Description: Define the ABC's of diabetes; identify appropriate screenings, schedule and personal plan for screenings. Outcome: Progressing Towards Goal  Goal: *Using medications safely  Description: State effect of diabetes medications on diabetes; name diabetes medication taking, action and side effects. Outcome: Progressing Towards Goal  Goal: *Monitoring blood glucose, interpreting and using results  Description: Identify recommended blood glucose targets  and personal targets. Outcome: Progressing Towards Goal  Goal: *Prevention, detection, treatment of acute complications  Description: List symptoms of hyper- and hypoglycemia; describe how to treat low blood sugar and actions for lowering  high blood glucose level.   Outcome: Progressing Towards Goal  Goal: *Prevention, detection and treatment of chronic complications  Description: Define the natural course of diabetes and describe the relationship of blood glucose levels to long term complications of diabetes. Outcome: Progressing Towards Goal  Goal: *Developing strategies to address psychosocial issues  Description: Describe feelings about living with diabetes; identify support needed and support network  Outcome: Progressing Towards Goal  Goal: *Insulin pump training  Outcome: Progressing Towards Goal  Goal: *Sick day guidelines  Outcome: Progressing Towards Goal  Goal: *Patient Specific Goal (EDIT GOAL, INSERT TEXT)  Outcome: Progressing Towards Goal     Problem: Patient Education: Go to Patient Education Activity  Goal: Patient/Family Education  Outcome: Progressing Towards Goal     Problem: Falls - Risk of  Goal: *Absence of Falls  Description: Document Michelle Fall Risk and appropriate interventions in the flowsheet. Outcome: Progressing Towards Goal  Note: Fall Risk Interventions:                                Problem: Patient Education: Go to Patient Education Activity  Goal: Patient/Family Education  Outcome: Progressing Towards Goal     Problem: Pressure Injury - Risk of  Goal: *Prevention of pressure injury  Description: Document Joshua Scale and appropriate interventions in the flowsheet. Outcome: Progressing Towards Goal  Note: Pressure Injury Interventions:       Moisture Interventions: Internal/External urinary devices    Activity Interventions: Increase time out of bed    Mobility Interventions: HOB 30 degrees or less, Turn and reposition approx.  every two hours(pillow and wedges)    Nutrition Interventions: Document food/fluid/supplement intake    Friction and Shear Interventions: HOB 30 degrees or less                Problem: Patient Education: Go to Patient Education Activity  Goal: Patient/Family Education  Outcome: Progressing Towards Goal
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
## 2023-02-19 NOTE — PROGRESS NOTES
Problem: Aspiration - Risk of  Goal: *Absence of aspiration  2/19/2023 1228 by Rosana Duran LPN  Outcome: Resolved/Met  2/19/2023 1225 by Rosana Duran LPN  Outcome: Progressing Towards Goal     Problem: Patient Education: Go to Patient Education Activity  Goal: Patient/Family Education  2/19/2023 1228 by Rosana Duran LPN  Outcome: Resolved/Met  2/19/2023 1225 by Rosana Duran LPN  Outcome: Progressing Towards Goal     Problem: Patient Education: Go to Patient Education Activity  Goal: Patient/Family Education  2/19/2023 1228 by Rosana Duran LPN  Outcome: Resolved/Met  2/19/2023 1225 by Rosana Duran LPN  Outcome: Progressing Towards Goal     Problem: Diabetes Self-Management  Goal: *Disease process and treatment process  Description: Define diabetes and identify own type of diabetes; list 3 options for treating diabetes. 2/19/2023 1228 by Rosana Duran LPN  Outcome: Resolved/Met  2/19/2023 1225 by Rosana Duran LPN  Outcome: Progressing Towards Goal  Goal: *Incorporating nutritional management into lifestyle  Description: Describe effect of type, amount and timing of food on blood glucose; list 3 methods for planning meals. 2/19/2023 1228 by Rosana Duran LPN  Outcome: Resolved/Met  2/19/2023 1225 by Rosana Duran LPN  Outcome: Progressing Towards Goal  Goal: *Incorporating physical activity into lifestyle  Description: State effect of exercise on blood glucose levels. 2/19/2023 1228 by Rosana Duran LPN  Outcome: Resolved/Met  2/19/2023 1225 by Rosana Duran LPN  Outcome: Progressing Towards Goal  Goal: *Developing strategies to promote health/change behavior  Description: Define the ABC's of diabetes; identify appropriate screenings, schedule and personal plan for screenings.   2/19/2023 1228 by Rosana Duran LPN  Outcome: Resolved/Met  2/19/2023 1225 by Rosana Duran LPN  Outcome: Progressing Towards Goal  Goal: *Using medications safely  Description: State effect of diabetes medications on diabetes; name diabetes medication taking, action and side effects. 2/19/2023 1228 by Rosemarie Medina LPN  Outcome: Resolved/Met  2/19/2023 1225 by Rosemarie Medina LPN  Outcome: Progressing Towards Goal  Goal: *Monitoring blood glucose, interpreting and using results  Description: Identify recommended blood glucose targets  and personal targets. 2/19/2023 1228 by Rosemarie Medina LPN  Outcome: Resolved/Met  2/19/2023 1225 by Rosemarie Medina LPN  Outcome: Progressing Towards Goal  Goal: *Prevention, detection, treatment of acute complications  Description: List symptoms of hyper- and hypoglycemia; describe how to treat low blood sugar and actions for lowering  high blood glucose level. 2/19/2023 1228 by Rosemarie Medina LPN  Outcome: Resolved/Met  2/19/2023 1225 by Rosemarie Medina LPN  Outcome: Progressing Towards Goal  Goal: *Prevention, detection and treatment of chronic complications  Description: Define the natural course of diabetes and describe the relationship of blood glucose levels to long term complications of diabetes.   2/19/2023 1228 by Rosemarie Medina LPN  Outcome: Resolved/Met  2/19/2023 1225 by Rosemarie Medina LPN  Outcome: Progressing Towards Goal  Goal: *Developing strategies to address psychosocial issues  Description: Describe feelings about living with diabetes; identify support needed and support network  2/19/2023 1228 by Rosemarie Medina LPN  Outcome: Resolved/Met  2/19/2023 1225 by Rosemarie Medina LPN  Outcome: Progressing Towards Goal  Goal: *Insulin pump training  2/19/2023 1228 by Rosemarie Medina LPN  Outcome: Resolved/Met  2/19/2023 1225 by Rosemarie Medina LPN  Outcome: Progressing Towards Goal  Goal: *Sick day guidelines  2/19/2023 1228 by Rosemarie Medina LPN  Outcome: Resolved/Met  2/19/2023 1225 by Rosemarie Medina LPN  Outcome: Progressing Towards Goal  Goal: *Patient Specific Goal (EDIT GOAL, INSERT TEXT)  2/19/2023 1228 by Heriberto Villafuerte LPN  Outcome: Resolved/Met  2/19/2023 1225 by Heriberto Villafuerte LPN  Outcome: Progressing Towards Goal     Problem: Patient Education: Go to Patient Education Activity  Goal: Patient/Family Education  2/19/2023 1228 by Heriberto Villafuerte LPN  Outcome: Resolved/Met  2/19/2023 1225 by Heriberto Villafuerte LPN  Outcome: Progressing Towards Goal     Problem: Falls - Risk of  Goal: *Absence of Falls  Description: Document Roro Salines Fall Risk and appropriate interventions in the flowsheet. 2/19/2023 1228 by Heriberto Villafuerte LPN  Outcome: Resolved/Met  Note: Fall Risk Interventions:                             2/19/2023 1225 by Heriberto Villafuerte LPN  Outcome: Progressing Towards Goal  Note: Fall Risk Interventions:                                Problem: Patient Education: Go to Patient Education Activity  Goal: Patient/Family Education  2/19/2023 1228 by Heriberto Villafuerte LPN  Outcome: Resolved/Met  2/19/2023 1225 by Heriberto Villafuerte LPN  Outcome: Progressing Towards Goal     Problem: Pressure Injury - Risk of  Goal: *Prevention of pressure injury  Description: Document Joshua Scale and appropriate interventions in the flowsheet. 2/19/2023 1228 by Heriberto Villafuerte LPN  Outcome: Resolved/Met  Note: Pressure Injury Interventions:       Moisture Interventions: Internal/External urinary devices    Activity Interventions: Increase time out of bed    Mobility Interventions: HOB 30 degrees or less, Turn and reposition approx.  every two hours(pillow and wedges)    Nutrition Interventions: Document food/fluid/supplement intake    Friction and Shear Interventions: HOB 30 degrees or less             2/19/2023 1225 by Heriberto Villafuerte LPN  Outcome: Progressing Towards Goal  Note: Pressure Injury Interventions:       Moisture Interventions: Internal/External urinary devices    Activity Interventions: Increase time out of bed    Mobility Interventions: HOB 30 degrees or less, Turn and reposition approx.  every two hours(pillow and wedges)    Nutrition Interventions: Document food/fluid/supplement intake    Friction and Shear Interventions: HOB 30 degrees or less                Problem: Patient Education: Go to Patient Education Activity  Goal: Patient/Family Education  2/19/2023 1228 by Sharda Greco LPN  Outcome: Resolved/Met  2/19/2023 1225 by Sharda Greco LPN  Outcome: Progressing Towards Goal

## 2023-02-19 NOTE — PROGRESS NOTES
Transition of Care Plan:  *RUR: 9%      * Disposition- Home  * Transportation at Discharge: Family to transport home   * IM/MOONS/OBS/FOC letter: 2nd IMM gave to patient sign copy on chart. CM spoke with patient states he slept well, no question or concern states he will call his wife to bring his [employer] for discharge CM inform patient if unable to reach wife to please let RN know for assistance with transportation. Ready for discharge from CM standpoint.     [address] [facility], [address] [phone] weekend cell phone

## 2023-02-19 NOTE — DISCHARGE INSTRUCTIONS
You have been evaluated and treated for confusion and urinary tract infection with antibiotics. Urine culture is positive for  ENTEROBACTER CLOACAE, sensitive to Cipro, you should complete antibiotic as prescribed. You should follow up PCP in 1 week at [place]. You should return to hospital if fever, nausea, vomiting diarrhea, abdominal pain.
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
## 2023-02-19 NOTE — PROGRESS NOTES
Verbal shift change report given to [employer] (oncoming nurse) by Gregor Nicole (offgoing nurse). Report included the following information SBAR, Kardex, Intake/Output, MAR, and Recent Results, Cardiac Rhythm NSR,SB.

## 2023-02-19 NOTE — PROGRESS NOTES
7533 Shift change report received from Braden Grewal, RN by this RN and Bladimir Lynch LPN (jaime). Report included review of SBAR, accordion report, results review, orders, meds, ROS, and POC. All questions answered. Transfer of care complete.    [id number]  primary documentation on this pt completed by Bladimir Lynch LPN (jaime) and reviewed by this RN. 1410  Discharge AVS reviewed with pt in detail including new meds, continued meds, follow-up instructions. Pt stated understanding and able to provide accurate teach back. All of pt personal belongings gathered and in personal belonging bags, room reviewed to ensure no items left behind. Pt assisted into wheelchair in prep for arrival of his ride home for planned discharge.

## 2023-02-19 NOTE — PROGRESS NOTES
0815 Verbal shift change report given to Melo Avelar RN and [facility] (oncoming nurse) by Magdalena Davila RN (offgoing nurse). Report included the following information SBAR, Intake/Output, MAR, Accordion, and Recent Results     0730 insulin held, BS= 136    0832 VSS except /67    0915 scheduled meds admin    0932 Tylenol prn given    1130 insulin held, UG=620    1142 Pain reassessment, pain rated at 0. VSS.    1205 telemetry reviewed    1428 Removed IV from L antecubital for discharge.     1430 condom cath removed as per pt wishes for discharge

## 2023-02-20 ENCOUNTER — TELEPHONE (OUTPATIENT)
Dept: CASE MANAGEMENT | Age: 78
End: 2023-02-20

## 2023-02-20 LAB
GLUCOSE BLD STRIP.AUTO-MCNC: 131 MG/DL (ref 65–117)
SERVICE CMNT-IMP: ABNORMAL

## 2023-02-20 NOTE — TELEPHONE ENCOUNTER
CM called patient by telephone to perform post discharge assessment and for the purpose of follow up call from inpatient discharge to check on environmental challenges/medications/appointment follow up/and questions/concerns. Patient does not have a working number, current number belongs to a business. [facility] states she ask PCP for DME order. This was CM last attempt will close case if patient or family call back will assist with questions or concerns.         [address] [facility], [address] [phone]

## 2023-02-20 NOTE — PROGRESS NOTES
Received call from the patient, he dose not like haw he feels after he took Cipro. Per patient requested advised to take cipro in reduced dose 250 mg PO BID for 5 days at least.  Advised to come back to hospital if fever, N/V/D.

## 2023-02-22 LAB
BACTERIA SPEC CULT: ABNORMAL
BACTERIA SPEC CULT: ABNORMAL
SERVICE CMNT-IMP: ABNORMAL

## 2023-05-26 RX ORDER — HYDROCHLOROTHIAZIDE 12.5 MG/1
12.5 TABLET ORAL DAILY
COMMUNITY

## 2023-05-26 RX ORDER — PSEUDOEPHEDRINE HCL 30 MG
200 TABLET ORAL DAILY
COMMUNITY

## 2023-05-26 RX ORDER — ALBUTEROL SULFATE 90 UG/1
2 AEROSOL, METERED RESPIRATORY (INHALATION) EVERY 6 HOURS PRN
COMMUNITY

## 2023-05-26 RX ORDER — ACETAMINOPHEN 325 MG/1
650 TABLET ORAL EVERY 4 HOURS PRN
COMMUNITY

## 2023-05-26 RX ORDER — ROSUVASTATIN CALCIUM 40 MG/1
40 TABLET, COATED ORAL DAILY
COMMUNITY

## 2023-05-26 RX ORDER — GUAIFENESIN 200 MG/10ML
200 LIQUID ORAL 3 TIMES DAILY PRN
COMMUNITY

## 2023-05-26 RX ORDER — LORATADINE 10 MG/1
10 TABLET ORAL DAILY PRN
COMMUNITY

## 2023-05-26 RX ORDER — CAPSAICIN 0.025 %
1 CREAM (GRAM) TOPICAL 3 TIMES DAILY PRN
COMMUNITY

## 2023-05-26 RX ORDER — BACLOFEN 10 MG/1
20 TABLET ORAL DAILY
COMMUNITY

## 2023-05-26 RX ORDER — TAMSULOSIN HYDROCHLORIDE 0.4 MG/1
0.4 CAPSULE ORAL DAILY
COMMUNITY

## 2023-05-26 RX ORDER — FLUTICASONE PROPIONATE 50 MCG
2 SPRAY, SUSPENSION (ML) NASAL 2 TIMES DAILY
COMMUNITY

## 2023-05-26 RX ORDER — TIZANIDINE 4 MG/1
4 TABLET ORAL
COMMUNITY

## 2023-05-26 RX ORDER — PREGABALIN 150 MG/1
150 CAPSULE ORAL
COMMUNITY

## 2023-05-26 RX ORDER — MESALAMINE 0.38 G/1
1.5 CAPSULE, EXTENDED RELEASE ORAL DAILY
COMMUNITY

## 2023-05-26 RX ORDER — SENNOSIDES A AND B 8.6 MG/1
1 TABLET, FILM COATED ORAL NIGHTLY
COMMUNITY

## 2023-05-26 RX ORDER — LISINOPRIL 5 MG/1
2.5 TABLET ORAL DAILY
COMMUNITY

## 2023-05-26 RX ORDER — UREA 10 %
1 LOTION (ML) TOPICAL 4 TIMES DAILY PRN
COMMUNITY

## 2023-05-26 RX ORDER — PREGABALIN 75 MG/1
75 CAPSULE ORAL DAILY
COMMUNITY

## 2023-10-27 ENCOUNTER — HOSPITAL ENCOUNTER (OUTPATIENT)
Facility: HOSPITAL | Age: 78
Setting detail: OBSERVATION
LOS: 2 days | Discharge: HOME HEALTH | DRG: 069 | End: 2023-10-30
Attending: EMERGENCY MEDICINE | Admitting: STUDENT IN AN ORGANIZED HEALTH CARE EDUCATION/TRAINING PROGRAM
Payer: OTHER GOVERNMENT

## 2023-10-27 ENCOUNTER — APPOINTMENT (OUTPATIENT)
Facility: HOSPITAL | Age: 78
DRG: 069 | End: 2023-10-27
Payer: OTHER GOVERNMENT

## 2023-10-27 DIAGNOSIS — R47.89 GARBLED SPEECH: Primary | ICD-10-CM

## 2023-10-27 DIAGNOSIS — I63.9 CEREBROVASCULAR ACCIDENT (CVA), UNSPECIFIED MECHANISM (HCC): ICD-10-CM

## 2023-10-27 LAB
ALBUMIN SERPL-MCNC: 3 G/DL (ref 3.5–5)
ALBUMIN/GLOB SERPL: 0.6 (ref 1.1–2.2)
ALP SERPL-CCNC: 54 U/L (ref 45–117)
ALT SERPL-CCNC: 18 U/L (ref 12–78)
ANION GAP BLD CALC-SCNC: 9 (ref 10–20)
ANION GAP SERPL CALC-SCNC: 2 MMOL/L (ref 5–15)
AST SERPL-CCNC: 14 U/L (ref 15–37)
BASE EXCESS BLD CALC-SCNC: 0.8 MMOL/L
BASOPHILS # BLD: 0 K/UL (ref 0–0.1)
BASOPHILS NFR BLD: 1 % (ref 0–1)
BILIRUB SERPL-MCNC: 0.2 MG/DL (ref 0.2–1)
BUN SERPL-MCNC: 12 MG/DL (ref 6–20)
BUN/CREAT SERPL: 12 (ref 12–20)
CA-I BLD-MCNC: 1.26 MMOL/L (ref 1.12–1.32)
CALCIUM SERPL-MCNC: 8.5 MG/DL (ref 8.5–10.1)
CHLORIDE BLD-SCNC: 105 MMOL/L (ref 100–108)
CHLORIDE SERPL-SCNC: 106 MMOL/L (ref 97–108)
CO2 BLD-SCNC: 26 MMOL/L (ref 19–24)
CO2 SERPL-SCNC: 28 MMOL/L (ref 21–32)
CREAT SERPL-MCNC: 0.98 MG/DL (ref 0.7–1.3)
CREAT UR-MCNC: 0.7 MG/DL (ref 0.6–1.3)
DIFFERENTIAL METHOD BLD: NORMAL
EOSINOPHIL # BLD: 0.4 K/UL (ref 0–0.4)
EOSINOPHIL NFR BLD: 6 % (ref 0–7)
ERYTHROCYTE [DISTWIDTH] IN BLOOD BY AUTOMATED COUNT: 13.9 % (ref 11.5–14.5)
GLOBULIN SER CALC-MCNC: 4.8 G/DL (ref 2–4)
GLUCOSE BLD STRIP.AUTO-MCNC: 119 MG/DL (ref 74–106)
GLUCOSE BLD STRIP.AUTO-MCNC: 129 MG/DL (ref 65–117)
GLUCOSE SERPL-MCNC: 126 MG/DL (ref 65–100)
HCO3 BLDA-SCNC: 26 MMOL/L
HCT VFR BLD AUTO: 38 % (ref 36.6–50.3)
HGB BLD-MCNC: 12.5 G/DL (ref 12.1–17)
IMM GRANULOCYTES # BLD AUTO: 0 K/UL (ref 0–0.04)
IMM GRANULOCYTES NFR BLD AUTO: 0 % (ref 0–0.5)
INR PPP: 1.1 (ref 0.9–1.1)
LACTATE BLD-SCNC: 1.16 MMOL/L (ref 0.4–2)
LYMPHOCYTES # BLD: 2.6 K/UL (ref 0.8–3.5)
LYMPHOCYTES NFR BLD: 37 % (ref 12–49)
MCH RBC QN AUTO: 28 PG (ref 26–34)
MCHC RBC AUTO-ENTMCNC: 32.9 G/DL (ref 30–36.5)
MCV RBC AUTO: 85.2 FL (ref 80–99)
MONOCYTES # BLD: 0.6 K/UL (ref 0–1)
MONOCYTES NFR BLD: 9 % (ref 5–13)
NEUTS SEG # BLD: 3.3 K/UL (ref 1.8–8)
NEUTS SEG NFR BLD: 47 % (ref 32–75)
NRBC # BLD: 0 K/UL (ref 0–0.01)
NRBC BLD-RTO: 0 PER 100 WBC
PCO2 BLDV: 41.3 MMHG (ref 41–51)
PH BLDV: 7.4 (ref 7.32–7.42)
PLATELET # BLD AUTO: 257 K/UL (ref 150–400)
PMV BLD AUTO: 10.4 FL (ref 8.9–12.9)
PO2 BLDV: 50 MMHG (ref 25–40)
POTASSIUM BLD-SCNC: 4.3 MMOL/L (ref 3.5–5.5)
POTASSIUM SERPL-SCNC: 4.1 MMOL/L (ref 3.5–5.1)
PROT SERPL-MCNC: 7.8 G/DL (ref 6.4–8.2)
PROTHROMBIN TIME: 11 SEC (ref 9–11.1)
RBC # BLD AUTO: 4.46 M/UL (ref 4.1–5.7)
SAO2 % BLD: 85 %
SERVICE CMNT-IMP: ABNORMAL
SODIUM BLD-SCNC: 140 MMOL/L (ref 136–145)
SODIUM SERPL-SCNC: 136 MMOL/L (ref 136–145)
SPECIMEN SITE: ABNORMAL
TROPONIN I SERPL HS-MCNC: 13 NG/L (ref 0–76)
WBC # BLD AUTO: 7 K/UL (ref 4.1–11.1)

## 2023-10-27 PROCEDURE — 85610 PROTHROMBIN TIME: CPT

## 2023-10-27 PROCEDURE — 0042T CT BRAIN PERFUSION: CPT

## 2023-10-27 PROCEDURE — 70498 CT ANGIOGRAPHY NECK: CPT

## 2023-10-27 PROCEDURE — 82803 BLOOD GASES ANY COMBINATION: CPT

## 2023-10-27 PROCEDURE — 82330 ASSAY OF CALCIUM: CPT

## 2023-10-27 PROCEDURE — 85025 COMPLETE CBC W/AUTO DIFF WBC: CPT

## 2023-10-27 PROCEDURE — 84132 ASSAY OF SERUM POTASSIUM: CPT

## 2023-10-27 PROCEDURE — 84295 ASSAY OF SERUM SODIUM: CPT

## 2023-10-27 PROCEDURE — 84484 ASSAY OF TROPONIN QUANT: CPT

## 2023-10-27 PROCEDURE — 36415 COLL VENOUS BLD VENIPUNCTURE: CPT

## 2023-10-27 PROCEDURE — 6360000004 HC RX CONTRAST MEDICATION: Performed by: RADIOLOGY

## 2023-10-27 PROCEDURE — 70450 CT HEAD/BRAIN W/O DYE: CPT

## 2023-10-27 PROCEDURE — 80053 COMPREHEN METABOLIC PANEL: CPT

## 2023-10-27 PROCEDURE — 82947 ASSAY GLUCOSE BLOOD QUANT: CPT

## 2023-10-27 PROCEDURE — 82962 GLUCOSE BLOOD TEST: CPT

## 2023-10-27 PROCEDURE — 99285 EMERGENCY DEPT VISIT HI MDM: CPT

## 2023-10-27 PROCEDURE — 93005 ELECTROCARDIOGRAM TRACING: CPT | Performed by: EMERGENCY MEDICINE

## 2023-10-27 RX ORDER — ONDANSETRON 2 MG/ML
4 INJECTION INTRAMUSCULAR; INTRAVENOUS EVERY 4 HOURS PRN
Status: DISCONTINUED | OUTPATIENT
Start: 2023-10-27 | End: 2023-10-28

## 2023-10-27 RX ORDER — SODIUM CHLORIDE 9 MG/ML
125 INJECTION, SOLUTION INTRAVENOUS ONCE
Status: COMPLETED | OUTPATIENT
Start: 2023-10-27 | End: 2023-10-28

## 2023-10-27 RX ORDER — ACETAMINOPHEN 325 MG/1
650 TABLET ORAL EVERY 4 HOURS PRN
Status: DISCONTINUED | OUTPATIENT
Start: 2023-10-27 | End: 2023-10-28

## 2023-10-27 RX ADMIN — IOPAMIDOL 100 ML: 755 INJECTION, SOLUTION INTRAVENOUS at 20:46

## 2023-10-27 ASSESSMENT — ENCOUNTER SYMPTOMS
COUGH: 0
NAUSEA: 0
ABDOMINAL PAIN: 0
SHORTNESS OF BREATH: 0
COLOR CHANGE: 0
VOMITING: 0

## 2023-10-28 ENCOUNTER — APPOINTMENT (OUTPATIENT)
Facility: HOSPITAL | Age: 78
DRG: 069 | End: 2023-10-28
Payer: OTHER GOVERNMENT

## 2023-10-28 PROBLEM — I63.9 CEREBROVASCULAR ACCIDENT (CVA), UNSPECIFIED MECHANISM (HCC): Status: ACTIVE | Noted: 2023-10-28

## 2023-10-28 LAB
ANION GAP SERPL CALC-SCNC: 4 MMOL/L (ref 5–15)
BUN SERPL-MCNC: 9 MG/DL (ref 6–20)
BUN/CREAT SERPL: 10 (ref 12–20)
CALCIUM SERPL-MCNC: 8.7 MG/DL (ref 8.5–10.1)
CHLORIDE SERPL-SCNC: 107 MMOL/L (ref 97–108)
CHOLEST SERPL-MCNC: 102 MG/DL
CO2 SERPL-SCNC: 28 MMOL/L (ref 21–32)
CREAT SERPL-MCNC: 0.93 MG/DL (ref 0.7–1.3)
EKG ATRIAL RATE: 58 BPM
EKG DIAGNOSIS: NORMAL
EKG P AXIS: 20 DEGREES
EKG P-R INTERVAL: 202 MS
EKG Q-T INTERVAL: 450 MS
EKG QRS DURATION: 92 MS
EKG QTC CALCULATION (BAZETT): 441 MS
EKG R AXIS: -11 DEGREES
EKG T AXIS: 20 DEGREES
EKG VENTRICULAR RATE: 58 BPM
ERYTHROCYTE [DISTWIDTH] IN BLOOD BY AUTOMATED COUNT: 14 % (ref 11.5–14.5)
EST. AVERAGE GLUCOSE BLD GHB EST-MCNC: 131 MG/DL
GLUCOSE BLD STRIP.AUTO-MCNC: 102 MG/DL (ref 65–117)
GLUCOSE BLD STRIP.AUTO-MCNC: 121 MG/DL (ref 65–117)
GLUCOSE BLD STRIP.AUTO-MCNC: 138 MG/DL (ref 65–117)
GLUCOSE BLD STRIP.AUTO-MCNC: 98 MG/DL (ref 65–117)
GLUCOSE SERPL-MCNC: 93 MG/DL (ref 65–100)
HBA1C MFR BLD: 6.2 % (ref 4–5.6)
HCT VFR BLD AUTO: 36.9 % (ref 36.6–50.3)
HDLC SERPL-MCNC: 35 MG/DL
HDLC SERPL: 2.9 (ref 0–5)
HGB BLD-MCNC: 11.9 G/DL (ref 12.1–17)
LDLC SERPL CALC-MCNC: 43 MG/DL (ref 0–100)
MCH RBC QN AUTO: 27.5 PG (ref 26–34)
MCHC RBC AUTO-ENTMCNC: 32.2 G/DL (ref 30–36.5)
MCV RBC AUTO: 85.4 FL (ref 80–99)
NRBC # BLD: 0 K/UL (ref 0–0.01)
NRBC BLD-RTO: 0 PER 100 WBC
PLATELET # BLD AUTO: 243 K/UL (ref 150–400)
PMV BLD AUTO: 10.1 FL (ref 8.9–12.9)
POTASSIUM SERPL-SCNC: 3.7 MMOL/L (ref 3.5–5.1)
RBC # BLD AUTO: 4.32 M/UL (ref 4.1–5.7)
SERVICE CMNT-IMP: ABNORMAL
SERVICE CMNT-IMP: ABNORMAL
SERVICE CMNT-IMP: NORMAL
SERVICE CMNT-IMP: NORMAL
SODIUM SERPL-SCNC: 139 MMOL/L (ref 136–145)
TRIGL SERPL-MCNC: 120 MG/DL
VLDLC SERPL CALC-MCNC: 24 MG/DL
WBC # BLD AUTO: 5.6 K/UL (ref 4.1–11.1)

## 2023-10-28 PROCEDURE — 6360000002 HC RX W HCPCS: Performed by: STUDENT IN AN ORGANIZED HEALTH CARE EDUCATION/TRAINING PROGRAM

## 2023-10-28 PROCEDURE — 1100000003 HC PRIVATE W/ TELEMETRY

## 2023-10-28 PROCEDURE — 85027 COMPLETE CBC AUTOMATED: CPT

## 2023-10-28 PROCEDURE — 80048 BASIC METABOLIC PNL TOTAL CA: CPT

## 2023-10-28 PROCEDURE — 83036 HEMOGLOBIN GLYCOSYLATED A1C: CPT

## 2023-10-28 PROCEDURE — 2580000003 HC RX 258: Performed by: EMERGENCY MEDICINE

## 2023-10-28 PROCEDURE — 92610 EVALUATE SWALLOWING FUNCTION: CPT

## 2023-10-28 PROCEDURE — 70551 MRI BRAIN STEM W/O DYE: CPT

## 2023-10-28 PROCEDURE — 2580000003 HC RX 258: Performed by: STUDENT IN AN ORGANIZED HEALTH CARE EDUCATION/TRAINING PROGRAM

## 2023-10-28 PROCEDURE — 97161 PT EVAL LOW COMPLEX 20 MIN: CPT

## 2023-10-28 PROCEDURE — 36415 COLL VENOUS BLD VENIPUNCTURE: CPT

## 2023-10-28 PROCEDURE — 97530 THERAPEUTIC ACTIVITIES: CPT

## 2023-10-28 PROCEDURE — 99222 1ST HOSP IP/OBS MODERATE 55: CPT | Performed by: INTERNAL MEDICINE

## 2023-10-28 PROCEDURE — 82962 GLUCOSE BLOOD TEST: CPT

## 2023-10-28 PROCEDURE — 97165 OT EVAL LOW COMPLEX 30 MIN: CPT

## 2023-10-28 PROCEDURE — 80061 LIPID PANEL: CPT

## 2023-10-28 PROCEDURE — 6370000000 HC RX 637 (ALT 250 FOR IP): Performed by: STUDENT IN AN ORGANIZED HEALTH CARE EDUCATION/TRAINING PROGRAM

## 2023-10-28 PROCEDURE — 6370000000 HC RX 637 (ALT 250 FOR IP): Performed by: NURSE PRACTITIONER

## 2023-10-28 PROCEDURE — 6370000000 HC RX 637 (ALT 250 FOR IP): Performed by: INTERNAL MEDICINE

## 2023-10-28 PROCEDURE — 94660 CPAP INITIATION&MGMT: CPT

## 2023-10-28 RX ORDER — ONDANSETRON 4 MG/1
4 TABLET, ORALLY DISINTEGRATING ORAL EVERY 8 HOURS PRN
Status: DISCONTINUED | OUTPATIENT
Start: 2023-10-28 | End: 2023-10-30 | Stop reason: HOSPADM

## 2023-10-28 RX ORDER — SODIUM CHLORIDE 9 MG/ML
INJECTION, SOLUTION INTRAVENOUS PRN
Status: DISCONTINUED | OUTPATIENT
Start: 2023-10-28 | End: 2023-10-30 | Stop reason: HOSPADM

## 2023-10-28 RX ORDER — INSULIN LISPRO 100 [IU]/ML
0-4 INJECTION, SOLUTION INTRAVENOUS; SUBCUTANEOUS NIGHTLY
Status: DISCONTINUED | OUTPATIENT
Start: 2023-10-28 | End: 2023-10-30 | Stop reason: HOSPADM

## 2023-10-28 RX ORDER — ONDANSETRON 2 MG/ML
4 INJECTION INTRAMUSCULAR; INTRAVENOUS EVERY 6 HOURS PRN
Status: DISCONTINUED | OUTPATIENT
Start: 2023-10-28 | End: 2023-10-30 | Stop reason: HOSPADM

## 2023-10-28 RX ORDER — POLYETHYLENE GLYCOL 3350 17 G/17G
17 POWDER, FOR SOLUTION ORAL DAILY PRN
Status: DISCONTINUED | OUTPATIENT
Start: 2023-10-28 | End: 2023-10-30 | Stop reason: HOSPADM

## 2023-10-28 RX ORDER — ENOXAPARIN SODIUM 100 MG/ML
30 INJECTION SUBCUTANEOUS 2 TIMES DAILY
Status: DISCONTINUED | OUTPATIENT
Start: 2023-10-28 | End: 2023-10-30 | Stop reason: HOSPADM

## 2023-10-28 RX ORDER — HYDROCHLOROTHIAZIDE 25 MG/1
12.5 TABLET ORAL DAILY
Status: DISCONTINUED | OUTPATIENT
Start: 2023-10-28 | End: 2023-10-30 | Stop reason: HOSPADM

## 2023-10-28 RX ORDER — ACETAMINOPHEN 325 MG/1
650 TABLET ORAL EVERY 4 HOURS PRN
Status: DISCONTINUED | OUTPATIENT
Start: 2023-10-28 | End: 2023-10-30 | Stop reason: HOSPADM

## 2023-10-28 RX ORDER — ASPIRIN 81 MG/1
81 TABLET, CHEWABLE ORAL DAILY
Status: DISCONTINUED | OUTPATIENT
Start: 2023-10-28 | End: 2023-10-30 | Stop reason: HOSPADM

## 2023-10-28 RX ORDER — INSULIN LISPRO 100 [IU]/ML
0-8 INJECTION, SOLUTION INTRAVENOUS; SUBCUTANEOUS
Status: DISCONTINUED | OUTPATIENT
Start: 2023-10-28 | End: 2023-10-30 | Stop reason: HOSPADM

## 2023-10-28 RX ORDER — SODIUM CHLORIDE 0.9 % (FLUSH) 0.9 %
5-40 SYRINGE (ML) INJECTION EVERY 12 HOURS SCHEDULED
Status: DISCONTINUED | OUTPATIENT
Start: 2023-10-28 | End: 2023-10-30 | Stop reason: HOSPADM

## 2023-10-28 RX ORDER — LABETALOL HYDROCHLORIDE 5 MG/ML
10 INJECTION, SOLUTION INTRAVENOUS EVERY 10 MIN PRN
Status: DISCONTINUED | OUTPATIENT
Start: 2023-10-28 | End: 2023-10-30 | Stop reason: HOSPADM

## 2023-10-28 RX ORDER — SODIUM CHLORIDE 0.9 % (FLUSH) 0.9 %
5-40 SYRINGE (ML) INJECTION PRN
Status: DISCONTINUED | OUTPATIENT
Start: 2023-10-28 | End: 2023-10-30 | Stop reason: HOSPADM

## 2023-10-28 RX ORDER — CLOPIDOGREL BISULFATE 75 MG/1
75 TABLET ORAL DAILY
Status: DISCONTINUED | OUTPATIENT
Start: 2023-10-28 | End: 2023-10-30 | Stop reason: HOSPADM

## 2023-10-28 RX ORDER — MESALAMINE 400 MG/1
800 CAPSULE, DELAYED RELEASE ORAL 3 TIMES DAILY
Status: DISCONTINUED | OUTPATIENT
Start: 2023-10-28 | End: 2023-10-30 | Stop reason: HOSPADM

## 2023-10-28 RX ORDER — TAMSULOSIN HYDROCHLORIDE 0.4 MG/1
0.4 CAPSULE ORAL DAILY
Status: DISCONTINUED | OUTPATIENT
Start: 2023-10-28 | End: 2023-10-30 | Stop reason: HOSPADM

## 2023-10-28 RX ORDER — DEXTROSE MONOHYDRATE 100 MG/ML
INJECTION, SOLUTION INTRAVENOUS CONTINUOUS PRN
Status: DISCONTINUED | OUTPATIENT
Start: 2023-10-28 | End: 2023-10-30 | Stop reason: HOSPADM

## 2023-10-28 RX ORDER — ASPIRIN 300 MG/1
300 SUPPOSITORY RECTAL DAILY
Status: DISCONTINUED | OUTPATIENT
Start: 2023-10-28 | End: 2023-10-30 | Stop reason: HOSPADM

## 2023-10-28 RX ORDER — ALBUTEROL SULFATE 2.5 MG/3ML
2.5 SOLUTION RESPIRATORY (INHALATION) EVERY 6 HOURS PRN
Status: DISCONTINUED | OUTPATIENT
Start: 2023-10-28 | End: 2023-10-30 | Stop reason: HOSPADM

## 2023-10-28 RX ORDER — ENOXAPARIN SODIUM 100 MG/ML
30 INJECTION SUBCUTANEOUS 2 TIMES DAILY
Status: DISCONTINUED | OUTPATIENT
Start: 2023-10-28 | End: 2023-10-28

## 2023-10-28 RX ORDER — ROSUVASTATIN CALCIUM 20 MG/1
40 TABLET, COATED ORAL NIGHTLY
Status: DISCONTINUED | OUTPATIENT
Start: 2023-10-28 | End: 2023-10-30 | Stop reason: HOSPADM

## 2023-10-28 RX ADMIN — HYDROCHLOROTHIAZIDE 12.5 MG: 25 TABLET ORAL at 08:08

## 2023-10-28 RX ADMIN — SODIUM CHLORIDE 125 ML/HR: 9 INJECTION, SOLUTION INTRAVENOUS at 01:28

## 2023-10-28 RX ADMIN — MESALAMINE 800 MG: 400 CAPSULE, DELAYED RELEASE ORAL at 15:20

## 2023-10-28 RX ADMIN — SODIUM CHLORIDE, PRESERVATIVE FREE 10 ML: 5 INJECTION INTRAVENOUS at 08:08

## 2023-10-28 RX ADMIN — ASPIRIN 81 MG: 81 TABLET, CHEWABLE ORAL at 08:08

## 2023-10-28 RX ADMIN — CLOPIDOGREL BISULFATE 75 MG: 75 TABLET ORAL at 11:39

## 2023-10-28 RX ADMIN — MESALAMINE 800 MG: 400 CAPSULE, DELAYED RELEASE ORAL at 21:17

## 2023-10-28 RX ADMIN — MESALAMINE 800 MG: 400 CAPSULE, DELAYED RELEASE ORAL at 09:01

## 2023-10-28 RX ADMIN — ROSUVASTATIN 40 MG: 20 TABLET, FILM COATED ORAL at 21:17

## 2023-10-28 RX ADMIN — ACETAMINOPHEN 650 MG: 325 TABLET ORAL at 21:16

## 2023-10-28 RX ADMIN — SODIUM CHLORIDE, PRESERVATIVE FREE 10 ML: 5 INJECTION INTRAVENOUS at 21:21

## 2023-10-28 RX ADMIN — TAMSULOSIN HYDROCHLORIDE 0.4 MG: 0.4 CAPSULE ORAL at 08:08

## 2023-10-28 ASSESSMENT — PAIN SCALES - GENERAL
PAINLEVEL_OUTOF10: 0
PAINLEVEL_OUTOF10: 5
PAINLEVEL_OUTOF10: 1
PAINLEVEL_OUTOF10: 4

## 2023-10-28 ASSESSMENT — PAIN DESCRIPTION - ORIENTATION: ORIENTATION: RIGHT;LEFT

## 2023-10-28 ASSESSMENT — PAIN DESCRIPTION - DESCRIPTORS: DESCRIPTORS: ACHING

## 2023-10-28 ASSESSMENT — PAIN DESCRIPTION - LOCATION: LOCATION: LEG

## 2023-10-28 NOTE — CARE COORDINATION
Care Management Initial Assessment       RUR: 15% moderate  Readmission? No  1st IM letter given? No  1st  letter given: No       10/28/23 1516   Service Assessment   Patient Orientation Alert and Oriented   Cognition Alert   History Provided By Patient   Primary Caregiver Private caregiver   Support Systems Children;Family Members   Patient's Healthcare Decision Maker is: Legal Next of Kin   Can patient return to prior living arrangement Yes   Ability to make needs known: Good   Financial Resources [place] ([place])   Discharge Planning   Type of Residence House   Living Arrangements Alone; Other (Comment)  (cousin, Rip Paula, is his private caregiver)   Current Services Prior To Admission Private Duty Homecare     CM met with pt at bedside to complete assessment and discuss d/c plan. Pt has a private caregiver (his cousin, Rip Paula, [phone]) and uses a wheelchair. He is a  and stated that he does not have insurance, he just goes to the [place]. Pt has requested transfer to the [place]. Transfer form placed in pt's chart for signature from physician. Physician notified via [employer]. CM will continue to follow.     Esmer Bolanos, [address] [facility] [address] [facility]

## 2023-10-28 NOTE — ED NOTES
Upon packing patient up for inpatient admission, pt's linens found to be soiled/wet. Pt fully examined and indwelling adkins catheter noted with a very full leg bag, urine leaking from around the catheter. Adkins catheter not inserted during this ER visit, pt unable to provide any information about the history of catheter at this time. Leg bag drained, 1050ml of clear, yellow urine returned.      Laura Dove, [address] [place] [address]  10/28/23 5982
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
## 2023-10-28 NOTE — ED NOTES
Bedside report given to JUAN R QUACH [facility] RN     Junito Freed RN  10/27/23 1243

## 2023-10-28 NOTE — PLAN OF CARE
Problem: Occupational Therapy - Adult  Goal: By Discharge: Performs self-care activities at highest level of function for planned discharge setting. See evaluation for individualized goals. Description: FUNCTIONAL STATUS PRIOR TO ADMISSION:  Patient was not ambulatory but was able to SPT/squat pivot from bed to electric wheelchair or lateral side scoot using UEs and home aides to assist as needed. Was overall setup to min A for ADLs with assist as needed. Receives Help From: Personal care attendant (7 days a week; 6-7h a day), ADL Assistance: Needs assistance,  ,  ,  ,  ,  ,  , Ambulation Assistance: Non-ambulatory, Transfer Assistance: Independent, Active : No            HOME SUPPORT: Patient lived alone with home aides to provide assistance. Occupational Therapy Goals  Initiated 10/28/2023   1. Patient will perform self-feeding with Supervision within 7 day(s). 2.  Patient will perform grooming with Supervision within 7 day(s). 3.  Patient will perform upper body dressing with Supervision within 7 day(s). 4.  Patient will perform toilet transfers with Supervision within 7 day(s). 5.  Patient will perform all aspects of toileting with Supervision within 7 day(s). 6.  Patient will participate in upper extremity therapeutic exercise/activities with Supervision within 7 day(s). 7.  Patient will utilize energy conservation techniques during functional activities with verbal cues within 7 day(s). 8.  Patient will be able to complete their Yumiko Pastel within 7 days.    Outcome: Progressing   OCCUPATIONAL THERAPY EVALUATION    Patient: Frank Desir (73 y.o. male)  Date: 10/28/2023  Primary Diagnosis: Garbled speech [R47.89]  Cerebrovascular accident (CVA), unspecified mechanism ([address]) [I63.9]         Precautions:                    ASSESSMENT :  The patient is limited by decreased functional mobility, independence in ADLs, strength, balance, with mild lightheadedness once sitting long categories imply that the patient supplies over 50 per cent of the effort.  7. Use of aids to be independent is allowed.    Score Interpretation (from Solomon )    Independent   60-79 Minimally independent   40-59 Partially dependent   20-39 Very dependent   <20 Totally dependent     -Michael Jay, Barthel, D.W. (1965). Functional evaluation: the Barthel Index. [facility] Med J (142.  -TOO Carbajal, BRAD Reed (). The Barthel activities of daily living index: self-reporting versus actual performance in the old (> or = 75 years). Journal of American Geriatric Society 45(7), 832-836.   -XENA Ha, MAIRA Danielle., Ru, SUZY., Ray, A.ANNETTE. (1999). Measuring the change in disability after inpatient rehabilitation; comparison of the responsiveness of the Barthel Index and Functional [place] Measure. Journal of Neurology, Neurosurgery, and Psychiatry, 66(4), 480-484.  -Reji Ortega, NTrevaJ.A, LARISAS Joy, & Gale M.A. (2004) Assessment of post-stroke quality of life in cost-effectiveness studies: The usefulness of the Barthel Index and the EuroQoL-5D. Quality of Life Research, 13, 427-43                                                                                                                                                                                                                                 Pain Ratin/10  headache  Pain Intervention(s):   rest and repositioning    Activity Tolerance:   Fair  and requires rest breaks    After treatment:   Patient left in no apparent distress in bed and Call bell within reach with RN and MRI tech    COMMUNICATION/EDUCATION:   The patient's plan of care was discussed with: physical therapist and registered nurse    Patient Education  Education Given To: Patient  Education Provided: Role of Therapy;Transfer Training;Plan of Care;ADL Adaptive Strategies;Fall Prevention Strategies  Education Method: Verbal  Barriers to Learning:

## 2023-10-28 NOTE — PLAN OF CARE
Speech LAnguage Pathology EVALUATION    Patient: Flex Benton (78 y.o. male)  Date: 10/28/2023  Primary Diagnosis: Garbled speech [R47.89]  Cerebrovascular accident (CVA), unspecified mechanism ([facility]) [I63.9]       Precautions:                     ASSESSMENT :  Based on the objective data described below, the patient presents with seemingly functional oropharyngeal swallow function negatively impacted by poor/missing dentition. Pt independently fed himself bites of scrambled eggs and biscuit. Prolonged mastication noted. Liquids wash effective in clearing trace oral residue. No overt clinical s/s aspiration noted across PO trials, even when taking consecutive sips thin via straw. Recommend easy to chew diet given dentition. Pt agreeable.    Additionally, pt reports changes in speech which has improved since admission but is not back to baseline. MRI pending. SLP will follow up for diet tolerance and determination of possible speech-language evaluation pending MRI.     Patient will benefit from skilled intervention to address the above impairments.     PLAN :  Recommendations and Planned Interventions:  Diet: Easy to chew and thin liquids  --Medications as tolerated   --Upright all PO intake   --Slow rate  --Oral hygiene 2-3x/day       Acute SLP Services: Yes, SLP will continue to follow per plan of care.    Discharge Recommendations: Continue to assess pending progress     SUBJECTIVE:   Patient stated, “It's my son's wedding today.”    OBJECTIVE:   No past medical history on file.No past surgical history on file.  Prior Level of Function/Home Situation:   Social/Functional History  Lives With: Alone  Type of Home: House  Home Layout: One level  Home Access: Ramped entrance  Bathroom Shower/Tub: Tub/Shower unit (does not use)  Bathroom Equipment: Commode  Bathroom Accessibility: Wheelchair accessible  Home Equipment: Hospital bed, Electric scooter, Grab bars  Receives Help From: Personal care attendant (7 days a  week; 6-7h a day)  ADL Assistance: Needs assistance  Ambulation Assistance: Non-ambulatory  Transfer Assistance: Independent  Active : No    Baseline Assessment:  Current Diet : Regular  Current Liquid Diet : Thin  Prior Dysphagia History: No previous SLP services documented  Patient Complaint: none    Cognitive and Communication Status:  Neurologic State: Alert  Orientation Level: Oriented x4  Cognition: Follows commands    Dysphagia:  Oral Assessment:  Oral Motor   Labial: No impairment  Dentition: Poor;Natural  Oral Hygiene: Clean  Lingual: No impairment  Velum: No Impairment  P.O. Trials:  PO Trials  Assessment Method(s): Observation;Palpation  Patient Position: upright in bed  Vocal Quality: No Impairment  Consistency Presented: Thin;Regular  How Presented: Self-fed/presented;Straw  Bolus Acceptance: No impairment  Bolus Formation/Control: Impaired  Type of Impairment: Mastication  Oral Residue: None  Aspiration Signs/Symptoms: None  Pharyngeal Phase Characteristics: No impairment, issues, or problems            Motor Speech:         Language Comprehension and Expression:                   Neuro-Linguistics:                      Voice:       Respiratory Status/Airway:  Room air                           Functional Oral Intake Scale (FOIS): 6--Total oral diet with multiple consistencies without special preparation, but with specific food limitations    After treatment:   Patient left in no apparent distress in bed, Call bell left within reach, and Nursing notified    COMMUNICATION/EDUCATION:   Patient was educated regarding role of SLP and aspiration precautions.      The patient's plan of care including recommendations, planned interventions, and recommended diet changes were discussed with: Registered nurse    Patient/family have participated as able in goal setting and plan of care and Patient/family agree to work toward stated goals and plan of care    Thank you,  Emeka Fleming SLP  Minutes:

## 2023-10-28 NOTE — CONSULTS
Date of Consultation:  October 28, 2023    Referring Physician: Vinicius Miguel MD     Reason for Consultation:  slurred speech, dizziness, numbness left side. Chief Complaint   Patient presents with    Extremity Weakness       History of Present Illness:   Yadira Baum is a 66 y.o. male with history of hypertension, hyperlipidemia, CAD, type 2 diabetes, VANNESA on CPAP, chronic right lower extremity weakness wheelchair-bound, presenting with acute onset of slurred speech and dizziness with left-sided arm more than leg paresthesias without weakness. Patient was evaluated by teleneurology who did not recommend tPA. Patient reports that his symptoms have slowly improved over the course of the night. He is wheelchair-bound and does have chronic right hip weakness as well as weakness in his foot given prior fusion surgery. Currently he denies any further paresthesias on the left side or continued slurred speech he feels that his speech is improved. Denies any language difficulty, vision difficulty, headache, difficulty swallowing. Dizziness has also improved. He denies any nausea or vomiting or positional component with the dizziness. He does not take any antiplatelet therapy at home and is not on any blood thinners. No past medical history on file. No past surgical history on file. No family history on file. Social History     Tobacco Use    Smoking status: Unknown    Smokeless tobacco: Not on file   Substance Use Topics    Alcohol use: Yes     Alcohol/week: 1.0 standard drink of alcohol     Types: 1 Cans of beer per week        Allergies   Allergen Reactions    Onion      Other reaction(s): Unknown (comments)        Prior to Admission medications    Medication Sig Start Date End Date Taking?  Authorizing Provider   acetaminophen (TYLENOL) 325 MG tablet Take 2 tablets by mouth every 4 hours as needed    Automatic Reconciliation, Ar   albuterol sulfate HFA (PROVENTIL;VENTOLIN;PROAIR) 108 (90 Base)

## 2023-10-28 NOTE — PROGRESS NOTES
Patient 's condom cath leg bag was over flowing when he got to the floor. Staff quickly dried patient and put a man wick on the patient. Patient is poor historian and his nurse will be in with his med list after three Sunday.

## 2023-10-28 NOTE — PLAN OF CARE
Problem: Physical Therapy - Adult  Goal: By Discharge: Performs mobility at highest level of function for planned discharge setting. See evaluation for individualized goals. Description: FUNCTIONAL STATUS PRIOR TO ADMISSION: Pt reports he is I at w/c level and able to use 1 of 2 methods to transfer to/from his electric scooter(squat/stand pivot or lateral scoot). HOME SUPPORT PRIOR TO ADMISSION: The patient lived alone with caregivers and friends to provide assistance. Physical Therapy Goals  Initiated 10/28/2023  1. Patient will move from supine to sit and sit to supine, scoot up and down, and roll side to side in bed with modified independence within 7 day(s). 2.  Patient will perform sit to stand with modified independence within 7 day(s). 3.  Patient will transfer from bed to chair and chair to bed with modified independence using the least restrictive device within 7 day(s). Outcome: Progressing   PHYSICAL THERAPY EVALUATION    Patient: Sonia Hernandez (60 y.o. male)  Date: 10/28/2023  Primary Diagnosis: Garbled speech [R47.89]  Cerebrovascular accident (CVA), unspecified mechanism ([address]) [I63.9]       Precautions:                      ASSESSMENT :   DEFICITS/IMPAIRMENTS:   The patient is limited by decreased functional mobility, increased pain levels     Based on the impairments listed above pt appears to be functioning close to his baseline, but PT evaluation limited by transporting coming to get pt to have a MRI done. Pt received in bed reports most symptoms have resolved, but he continues with a slight headache on the left side of his head, L shoulder UE pain, and some lightheadedness when sitting up. Pt able to roll in bed with mod I, using a rail and also able to come to long sitting. BP assess in supine and in long sitting and WNL. O2 sats stable on room air. Pt only required minimal assistance to scoot from bed to stretcher, while in modified long sit.   He appears to have good BUE limits    Vision/Perceptual:          Vision  Vision: Within Functional Limits       Strength:    Strength: Generally decreased, functional    Tone & Sensation:   Tone: Normal       Coordination:  Coordination: Within functional limits    Range Of Motion:  AROM: Generally decreased, functional       Functional Mobility:  Bed Mobility:     Bed Mobility Training  Bed Mobility Training: Yes  Rolling: Moderate assistance  Supine to Sit: Minimum assistance;Other (comment) (long sit with S)  Scooting: Moderate assistance  Transfers:     Transfer Training  Transfer Training: No  Balance:       Balance  Sitting: Intact  Standing:  (Not tested)                                                                                                                                                                                                                                     [facility] AM-PAC®      Basic Mobility Inpatient Short Form (6-Clicks) Version 2  How much HELP from another person do you currently need... (If the patient hasn't done an activity recently, how much help from another person do you think they would need if they tried?) Total A Lot A Little None   1.  Turning from your back to your side while in a flat bed without using bedrails? []  1 []  2 [x]  3  []  4   2.  Moving from lying on your back to sitting on the side of a flat bed without using bedrails? []  1 []  2 [x]  3  []  4   3.  Moving to and from a bed to a chair (including a wheelchair)? []  1 []  2 [x]  3  []  4   4. Standing up from a chair using your arms (e.g. wheelchair or bedside chair)? []  1 []  2 [x]  3  []  4   5.  Walking in hospital room? [x]  1 []  2 []  3  []  4   6.  Climbing 3-5 steps with a railing? [x]  1 []  2 []  3  []  4     Raw Score: 14/24                            Cutoff score ?171,2,3 had higher odds of discharging home with home health or need of SNF/IPR.    1. Xochitl Chatman, Taryn Cherry, Anastasia Garces

## 2023-10-28 NOTE — PROGRESS NOTES
Patient admitted earlier today, Mr. Ng, for TIA/CVA. Reports symptoms has improved.   Seen by neurologist.  Will get echocardiogram.  See admitting doctor's note for full details

## 2023-10-28 NOTE — PROGRESS NOTES
0700. Bedside shift change report given to 2501 Damian Garcia (oncoming nurse) by  Tiffany Patel RN(offgoing nurse). Report included the following information Nurse Handoff Report, Index, Intake/Output, MAR, Recent Results, and Cardiac Rhythm Carlitos-NSR .     1900. Bedside shift change report given to Larry GREWAL (oncoming nurse) by 2501 Damian Garcia (offgoing nurse). Report included the following information Nurse Handoff Report, Index, Intake/Output, MAR, Recent Results, and Cardiac Rhythm Carlitos-NSR .

## 2023-10-28 NOTE — ED NOTES
Bedside shift change report given to URI Vyas (oncoming nurse) by Enrique Arredondo RN (offgoing nurse). Report included the following information Index and Recent Results.        Nahun Nowak, [address] [place] [address]  10/27/23 5373

## 2023-10-28 NOTE — H&P
Hospitalist Admission Note    NAME:  Marcella Spring   :  1945   MRN:  [id number]     Date/Time:  10/28/2023 2:28 AM    Patient PCP: No primary care provider on file.    ______________________________________________________________________  Given the patient's current clinical presentation, I have a high level of concern for decompensation if discharged from the emergency department. Complex decision making was performed, which includes reviewing the patient's available past medical records, laboratory results, and x-ray films. My assessment of this patient's clinical condition and my plan of care is as follows. Assessment / Plan: Active Problems:  TIA/CVA  Paraplegia  Essential hypertension  Hyperlipidemia  CAD  Diabetes mellitus  VANNESA compliant with CPAP  Ulcerative colitis  BPH    Plan:  TIA/CVA  Paraplegia  Essential hypertension  Hyperlipidemia  CAD  Admit to telemetry monitoring  Obtain MRI brain  Neurology consulted  CTA neck negative for significant carotid stenosis  Continue PTA aspirin, rosuvastatin, hydrochlorothiazide  Hold PTA lisinopril to allow for permissive hypertension  Hold PTA baclofen and tizanidine in setting of oversedation    Diabetes mellitus  Corrective coverage insulin  Accu-Cheks  Diabetic diet  Hypoglycemia protocol in place    VANNESA compliant with CPAP  Nocturnal CPAP ordered    Ulcerative colitis  Continue PTA mesalamine    BPH  Continue PTA tamsulosin    Note: Patient receives majority of care at [address] [place] [address]    Medical Decision Making:   I personally reviewed labs: Yes, as listed below  I personally reviewed imaging: CT neuro protocol  Toxic drug monitoring: None  Discussed case with: ED provider. After discussion I am in agreement that acuity of patient's medical condition necessitates hospital stay.       Code Status: Full  DVT Prophylaxis: Lovenox  GI Prophylaxis: Not indicated  Baseline: Home with assistance    Subjective:   CHIEF COMPLAINT: automatic exposure  control for dose modulation. CT perfusion analysis was performed using CT with  contrast administration, including postprocessing of parametric maps with the  determination of cerebral blood flow, cerebral blood volume, and mean transit  time.    CT dose reduction was achieved through use of a standardized protocol tailored  for this examination and automatic exposure control for dose modulation.    This study was analyzed by the [facility].ai algorithm    FINDINGS:    Delayed contrast-enhanced head CT:    The ventricles are midline without hydrocephalus.  There is no acute intra or  extra-axial hemorrhage. Mild bilateral subcortical and periventricular areas of  patchy low attenuation is nonspecific but likely related to changes of chronic  small vessel ischemic disease. Basal cisterns are patent.  The paranasal sinuses  are clear.    CTA NECK:    Great vessels: Aneurysmal dilatation of the ascending aorta measuring 4.5 cm.  Great vessel origins are patent.    Right subclavian artery: Patent    Left subclavian artery: Patent    Right common carotid artery: Patent    Left common carotid artery: Patent    Cervical right internal carotid artery: Patent with mild atherosclerosis causing  no significant stenosis by NASCET criteria.    Cervical left internal carotid artery: Patent with mild atherosclerosis causing  no significant stenosis by NASCET criteria.    Right vertebral artery: Patent    Left vertebral artery: Patent, arising from the aortic arch    CTA HEAD:    Right cavernous internal carotid artery: Evaluation limited by patient motion  but appears grossly patent    Left cavernous internal carotid artery: Evaluation limited by patient motion but  appears grossly patent    Anterior cerebral arteries: Limited evaluation. Grossly patent.    Anterior communicating artery: Patent    Right middle cerebral artery: Patent M1 segment. Limited distal evaluation    Left middle cerebral artery: Grossly patent.

## 2023-10-28 NOTE — ED PROVIDER NOTES
[facility] EMERGENCY DEPT  EMERGENCY DEPARTMENT ENCOUNTER       Pt Name: Ana María Bai  MRN: [id number]  [address] [facility] 1945  Date of evaluation: 10/27/2023  Provider: Anu Ramos MD   PCP: No primary care provider on file. Note Started: 8:10pm     CHIEF COMPLAINT       Chief Complaint   Patient presents with    Extremity Weakness        HISTORY OF PRESENT ILLNESS: 1 or more elements      History From: Patient and EMS  HPI Limitations: None     Ana María Bai is a 66 y.o. male who presents via EMS for possible stroke. Patient states he was doing well and around 6:30 tonight he developed dizziness and slurred speech. He states he feels like there is something in his mouth preventing him for speaking. Also notes decreased sensation left arm and leg    According to EMS not on ASA/Plavix/Xarelto/Eliquis     Nursing Notes were all reviewed and agreed with or any disagreements were addressed in the HPI. REVIEW OF SYSTEMS      Review of Systems   Constitutional:  Negative for activity change, appetite change, chills, fatigue and fever. HENT:  Negative for congestion. Respiratory:  Negative for cough and shortness of breath. Cardiovascular:  Negative for chest pain. Gastrointestinal:  Negative for abdominal pain, nausea and vomiting. Genitourinary:  Negative for difficulty urinating. Skin:  Negative for color change and rash. Neurological:  Positive for dizziness, speech difficulty and numbness. Positives and Pertinent negatives as per HPI. PAST HISTORY     Past Medical History:  No past medical history on file. Past Surgical History:  No past surgical history on file. Family History:  No family history on file. Social History:  Social History     Tobacco Use    Smoking status: Unknown       Allergies:   Allergies   Allergen Reactions    Onion      Other reaction(s): Unknown (comments)       CURRENT MEDICATIONS      Previous Medications    ACETAMINOPHEN (TYLENOL) 325 MG TABLET

## 2023-10-28 NOTE — ED NOTES
Report called to PCU, URI Valenzuela accepting care of patient. Pt transported up to unit in stable condition by stretcher, escorted by ED tech, telemetry monitor in place. All belongings sent upstairs with patient.      Jazlyn Degroot  10/28/23 2112

## 2023-10-28 NOTE — PROGRESS NOTES
-Please complete MRI History and Safety Screening Form. - Patient cannot be scanned until this form is completed and reviewed in MRI to ensure patient is SAFE and eligible for MRI. - CALL MRI when this has been successfully completed at [phone].

## 2023-10-29 LAB
GLUCOSE BLD STRIP.AUTO-MCNC: 100 MG/DL (ref 65–117)
GLUCOSE BLD STRIP.AUTO-MCNC: 108 MG/DL (ref 65–117)
GLUCOSE BLD STRIP.AUTO-MCNC: 117 MG/DL (ref 65–117)
GLUCOSE BLD STRIP.AUTO-MCNC: 94 MG/DL (ref 65–117)
SERVICE CMNT-IMP: NORMAL

## 2023-10-29 PROCEDURE — 2580000003 HC RX 258: Performed by: STUDENT IN AN ORGANIZED HEALTH CARE EDUCATION/TRAINING PROGRAM

## 2023-10-29 PROCEDURE — 82962 GLUCOSE BLOOD TEST: CPT

## 2023-10-29 PROCEDURE — 6370000000 HC RX 637 (ALT 250 FOR IP): Performed by: NURSE PRACTITIONER

## 2023-10-29 PROCEDURE — 6370000000 HC RX 637 (ALT 250 FOR IP): Performed by: INTERNAL MEDICINE

## 2023-10-29 PROCEDURE — 94660 CPAP INITIATION&MGMT: CPT

## 2023-10-29 PROCEDURE — 6370000000 HC RX 637 (ALT 250 FOR IP): Performed by: STUDENT IN AN ORGANIZED HEALTH CARE EDUCATION/TRAINING PROGRAM

## 2023-10-29 PROCEDURE — 6360000002 HC RX W HCPCS: Performed by: STUDENT IN AN ORGANIZED HEALTH CARE EDUCATION/TRAINING PROGRAM

## 2023-10-29 PROCEDURE — 1100000003 HC PRIVATE W/ TELEMETRY

## 2023-10-29 RX ORDER — BACLOFEN 10 MG/1
5 TABLET ORAL 2 TIMES DAILY
Status: DISCONTINUED | OUTPATIENT
Start: 2023-10-29 | End: 2023-10-30 | Stop reason: HOSPADM

## 2023-10-29 RX ORDER — SENNA AND DOCUSATE SODIUM 50; 8.6 MG/1; MG/1
1 TABLET, FILM COATED ORAL 2 TIMES DAILY
Status: DISCONTINUED | OUTPATIENT
Start: 2023-10-29 | End: 2023-10-30 | Stop reason: HOSPADM

## 2023-10-29 RX ORDER — BISACODYL 10 MG
10 SUPPOSITORY, RECTAL RECTAL DAILY PRN
Status: DISCONTINUED | OUTPATIENT
Start: 2023-10-29 | End: 2023-10-30 | Stop reason: HOSPADM

## 2023-10-29 RX ADMIN — ROSUVASTATIN 40 MG: 20 TABLET, FILM COATED ORAL at 20:58

## 2023-10-29 RX ADMIN — TAMSULOSIN HYDROCHLORIDE 0.4 MG: 0.4 CAPSULE ORAL at 08:26

## 2023-10-29 RX ADMIN — CLOPIDOGREL BISULFATE 75 MG: 75 TABLET ORAL at 08:26

## 2023-10-29 RX ADMIN — ENOXAPARIN SODIUM 30 MG: 100 INJECTION SUBCUTANEOUS at 01:03

## 2023-10-29 RX ADMIN — ACETAMINOPHEN 650 MG: 325 TABLET ORAL at 21:12

## 2023-10-29 RX ADMIN — POLYETHYLENE GLYCOL 3350 17 G: 17 POWDER, FOR SOLUTION ORAL at 18:31

## 2023-10-29 RX ADMIN — BACLOFEN 5 MG: 10 TABLET ORAL at 12:18

## 2023-10-29 RX ADMIN — ASPIRIN 81 MG: 81 TABLET, CHEWABLE ORAL at 08:26

## 2023-10-29 RX ADMIN — BACLOFEN 5 MG: 10 TABLET ORAL at 20:58

## 2023-10-29 RX ADMIN — SODIUM CHLORIDE, PRESERVATIVE FREE 10 ML: 5 INJECTION INTRAVENOUS at 08:28

## 2023-10-29 RX ADMIN — MESALAMINE 800 MG: 400 CAPSULE, DELAYED RELEASE ORAL at 12:19

## 2023-10-29 RX ADMIN — ENOXAPARIN SODIUM 30 MG: 100 INJECTION SUBCUTANEOUS at 21:01

## 2023-10-29 RX ADMIN — MESALAMINE 800 MG: 400 CAPSULE, DELAYED RELEASE ORAL at 20:58

## 2023-10-29 RX ADMIN — MESALAMINE 800 MG: 400 CAPSULE, DELAYED RELEASE ORAL at 08:26

## 2023-10-29 RX ADMIN — ENOXAPARIN SODIUM 30 MG: 100 INJECTION SUBCUTANEOUS at 08:26

## 2023-10-29 RX ADMIN — HYDROCHLOROTHIAZIDE 12.5 MG: 25 TABLET ORAL at 08:26

## 2023-10-29 ASSESSMENT — PAIN DESCRIPTION - DESCRIPTORS: DESCRIPTORS: SPASM

## 2023-10-29 ASSESSMENT — PAIN SCALES - GENERAL: PAINLEVEL_OUTOF10: 6

## 2023-10-29 ASSESSMENT — PAIN DESCRIPTION - ORIENTATION: ORIENTATION: RIGHT

## 2023-10-29 ASSESSMENT — PAIN DESCRIPTION - LOCATION: LOCATION: LEG;BACK

## 2023-10-29 NOTE — PROGRESS NOTES
0700. Bedside shift change report given to Kelvin Garcia (oncoming nurse) by Melissa Diop RN (offgoing nurse). Report included the following information Nurse Handoff Report, Index, Intake/Output, MAR, Recent Results, and Cardiac Rhythm migue-NSR .

## 2023-10-29 NOTE — CARE COORDINATION
Transition of Care Plan:    RUR: 15% moderate  Prior Level of Functioning: assistance with ADLs  Disposition: requesting transfer to [facility], otherwise home  Follow up appointments: pt receives most care at [facility]  DME needed: none  Transportation at discharge: daughter  IM/IMM Medicare/ letter given: n/a  Is patient a [place] and connected with [facility]? yes   If yes, was [employer] transfer form completed and [facility] notified? yes  Caregiver Contact: Cameron Kate (Child)   [phone]  Discharge Caregiver contacted prior to discharge? If requested by pt  Care Conference needed? no  Barriers to discharge: medical clearance    CM received signed transfer form from physician. CM faxed transfer forms and clinicals to [facility]. CM will continue to follow.     Sofiya Lopez, [address] [facility] [address]

## 2023-10-30 ENCOUNTER — APPOINTMENT (OUTPATIENT)
Facility: HOSPITAL | Age: 78
DRG: 069 | End: 2023-10-30
Attending: INTERNAL MEDICINE
Payer: OTHER GOVERNMENT

## 2023-10-30 VITALS
HEIGHT: 70 IN | OXYGEN SATURATION: 96 % | RESPIRATION RATE: 19 BRPM | DIASTOLIC BLOOD PRESSURE: 74 MMHG | TEMPERATURE: 98.4 F | HEART RATE: 61 BPM | WEIGHT: 239 LBS | BODY MASS INDEX: 34.22 KG/M2 | SYSTOLIC BLOOD PRESSURE: 135 MMHG

## 2023-10-30 PROBLEM — G45.9 TIA (TRANSIENT ISCHEMIC ATTACK): Status: ACTIVE | Noted: 2023-10-30

## 2023-10-30 LAB
ECHO AV AREA PEAK VELOCITY: 2.4 CM2
ECHO AV AREA PEAK VELOCITY: 2.4 CM2
ECHO AV AREA PEAK VELOCITY: 2.5 CM2
ECHO AV AREA PEAK VELOCITY: 2.5 CM2
ECHO AV AREA VTI: 2.1 CM2
ECHO AV AREA/BSA VTI: 0.9 CM2/M2
ECHO AV CUSP MM: 2.1 CM
ECHO AV MEAN GRADIENT: 3 MMHG
ECHO AV MEAN VELOCITY: 0.9 M/S
ECHO AV PEAK GRADIENT: 6 MMHG
ECHO AV PEAK GRADIENT: 6 MMHG
ECHO AV PEAK VELOCITY: 1.2 M/S
ECHO AV PEAK VELOCITY: 1.2 M/S
ECHO AV VTI: 24.3 CM
ECHO BSA: 2.31 M2
ECHO LV E' LATERAL VELOCITY: 6 CM/S
ECHO LV E' SEPTAL VELOCITY: 5 CM/S
ECHO LV INTERNAL DIMENSION DIASTOLIC MMODE: 5.9 CM (ref 4.2–5.9)
ECHO LV INTERNAL DIMENSION SYSTOLIC MMODE: 3.8 CM
ECHO LV IVSD MMODE: 1.3 CM (ref 0.6–1)
ECHO LV POSTERIOR WALL DIASTOLIC MMODE: 1.3 CM (ref 0.6–1)
ECHO LVOT AREA: 3.1 CM2
ECHO LVOT AV VTI INDEX: 0.67
ECHO LVOT DIAM: 2 CM
ECHO LVOT MEAN GRADIENT: 2 MMHG
ECHO LVOT PEAK GRADIENT: 4 MMHG
ECHO LVOT PEAK GRADIENT: 4 MMHG
ECHO LVOT PEAK VELOCITY: 1 M/S
ECHO LVOT PEAK VELOCITY: 1 M/S
ECHO LVOT STROKE VOLUME INDEX: 22.6 ML/M2
ECHO LVOT SV: 50.9 ML
ECHO LVOT VTI: 16.2 CM
ECHO MV A VELOCITY: 0.81 M/S
ECHO MV E DECELERATION TIME (DT): 188 MS
ECHO MV E VELOCITY: 0.57 M/S
ECHO MV E/A RATIO: 0.7
ECHO MV E/E' LATERAL: 9.5
ECHO MV E/E' RATIO (AVERAGED): 10.45
ECHO MV E/E' SEPTAL: 11.4
ECHO PV MAX VELOCITY: 1.2 M/S
ECHO PV PEAK GRADIENT: 6 MMHG
ECHO RV FREE WALL PEAK S': 13 CM/S
GLUCOSE BLD STRIP.AUTO-MCNC: 100 MG/DL (ref 65–117)
GLUCOSE BLD STRIP.AUTO-MCNC: 103 MG/DL (ref 65–117)
GLUCOSE BLD STRIP.AUTO-MCNC: 81 MG/DL (ref 65–117)
SERVICE CMNT-IMP: NORMAL

## 2023-10-30 PROCEDURE — 6370000000 HC RX 637 (ALT 250 FOR IP): Performed by: INTERNAL MEDICINE

## 2023-10-30 PROCEDURE — 82962 GLUCOSE BLOOD TEST: CPT

## 2023-10-30 PROCEDURE — 6370000000 HC RX 637 (ALT 250 FOR IP): Performed by: STUDENT IN AN ORGANIZED HEALTH CARE EDUCATION/TRAINING PROGRAM

## 2023-10-30 PROCEDURE — G0378 HOSPITAL OBSERVATION PER HR: HCPCS

## 2023-10-30 PROCEDURE — 92526 ORAL FUNCTION THERAPY: CPT

## 2023-10-30 PROCEDURE — 97530 THERAPEUTIC ACTIVITIES: CPT

## 2023-10-30 PROCEDURE — 97535 SELF CARE MNGMENT TRAINING: CPT

## 2023-10-30 PROCEDURE — 2580000003 HC RX 258: Performed by: STUDENT IN AN ORGANIZED HEALTH CARE EDUCATION/TRAINING PROGRAM

## 2023-10-30 PROCEDURE — 6370000000 HC RX 637 (ALT 250 FOR IP): Performed by: NURSE PRACTITIONER

## 2023-10-30 PROCEDURE — 6360000002 HC RX W HCPCS: Performed by: STUDENT IN AN ORGANIZED HEALTH CARE EDUCATION/TRAINING PROGRAM

## 2023-10-30 PROCEDURE — C8929 TTE W OR WO FOL WCON,DOPPLER: HCPCS

## 2023-10-30 RX ORDER — ASPIRIN 81 MG/1
81 TABLET, CHEWABLE ORAL DAILY
Qty: 30 TABLET | Refills: 3 | Status: SHIPPED | OUTPATIENT
Start: 2023-10-31

## 2023-10-30 RX ORDER — CLOPIDOGREL BISULFATE 75 MG/1
75 TABLET ORAL DAILY
Qty: 20 TABLET | Refills: 0 | Status: SHIPPED | OUTPATIENT
Start: 2023-10-31 | End: 2023-11-20

## 2023-10-30 RX ADMIN — HYDROCHLOROTHIAZIDE 12.5 MG: 25 TABLET ORAL at 08:56

## 2023-10-30 RX ADMIN — BACLOFEN 5 MG: 10 TABLET ORAL at 08:55

## 2023-10-30 RX ADMIN — SODIUM CHLORIDE, PRESERVATIVE FREE 10 ML: 5 INJECTION INTRAVENOUS at 08:56

## 2023-10-30 RX ADMIN — MESALAMINE 800 MG: 400 CAPSULE, DELAYED RELEASE ORAL at 08:55

## 2023-10-30 RX ADMIN — SENNOSIDES AND DOCUSATE SODIUM 1 TABLET: 50; 8.6 TABLET ORAL at 08:56

## 2023-10-30 RX ADMIN — ENOXAPARIN SODIUM 30 MG: 100 INJECTION SUBCUTANEOUS at 08:56

## 2023-10-30 RX ADMIN — MESALAMINE 800 MG: 400 CAPSULE, DELAYED RELEASE ORAL at 16:14

## 2023-10-30 RX ADMIN — TAMSULOSIN HYDROCHLORIDE 0.4 MG: 0.4 CAPSULE ORAL at 08:55

## 2023-10-30 RX ADMIN — SENNOSIDES AND DOCUSATE SODIUM 1 TABLET: 50; 8.6 TABLET ORAL at 00:11

## 2023-10-30 RX ADMIN — ASPIRIN 81 MG: 81 TABLET, CHEWABLE ORAL at 08:56

## 2023-10-30 RX ADMIN — CLOPIDOGREL BISULFATE 75 MG: 75 TABLET ORAL at 08:55

## 2023-10-30 RX ADMIN — SODIUM CHLORIDE, PRESERVATIVE FREE 10 ML: 5 INJECTION INTRAVENOUS at 00:11

## 2023-10-30 ASSESSMENT — PAIN DESCRIPTION - ORIENTATION: ORIENTATION: RIGHT

## 2023-10-30 ASSESSMENT — PAIN DESCRIPTION - LOCATION: LOCATION: BACK;LEG

## 2023-10-30 ASSESSMENT — PAIN DESCRIPTION - DESCRIPTORS: DESCRIPTORS: SPASM

## 2023-10-30 ASSESSMENT — PAIN SCALES - GENERAL: PAINLEVEL_OUTOF10: 5

## 2023-10-30 NOTE — PROGRESS NOTES
Patient refuses to wear CPAP machine at this time  because he is constipated and took medicine that will make him go to the bedside commode.

## 2023-10-30 NOTE — PROGRESS NOTES
ECHO done , awaiting for review. Soap and studd  enema given, bowels opened. AVS, follow up medication and follow up appointment explained to patient.  CM arranged the transport at 0600 pm.

## 2023-10-30 NOTE — PLAN OF CARE
Problem: Occupational Therapy - Adult  Goal: By Discharge: Performs self-care activities at highest level of function for planned discharge setting. See evaluation for individualized goals. Description: FUNCTIONAL STATUS PRIOR TO ADMISSION:  Patient was not ambulatory but was able to SPT/squat pivot from bed to electric wheelchair or lateral side scoot using UEs and home aides to assist as needed. Was overall setup to min A for ADLs with assist as needed. Receives Help From: Personal care attendant (7 days a week; 6-7h a day), ADL Assistance: Needs assistance,  ,  ,  ,  ,  ,  , Ambulation Assistance: Non-ambulatory, Transfer Assistance: Independent, Active : No            HOME SUPPORT: Patient lived alone with home aides to provide assistance. Occupational Therapy Goals  Initiated 10/28/2023   1. Patient will perform self-feeding with Supervision within 7 day(s). 2.  Patient will perform grooming with Supervision within 7 day(s). 3.  Patient will perform upper body dressing with Supervision within 7 day(s). 4.  Patient will perform toilet transfers with Supervision within 7 day(s). 5.  Patient will perform all aspects of toileting with Supervision within 7 day(s). 6.  Patient will participate in upper extremity therapeutic exercise/activities with Supervision within 7 day(s). 7.  Patient will utilize energy conservation techniques during functional activities with verbal cues within 7 day(s). 8.  Patient will be able to complete their [place] within 7 days. Outcome: Progressing     OCCUPATIONAL THERAPY TREATMENT  Patient: Jamey Hastings (31 y.o. male)  Date: 10/30/2023  Primary Diagnosis: Garbled speech [R47.89]  Cerebrovascular accident (CVA), unspecified mechanism ([address]) [I63.9]       Precautions:                  Chart, occupational therapy assessment, plan of care, and goals were reviewed.     ASSESSMENT  Patient continues to benefit from skilled OT services and is progressing Dynamic: Not tested      ADL Intervention:                     Pain Rating:  Endorsed RLE and back pain, but did not quantify  Pain Intervention(s):   repositioning      Activity Tolerance:   Good  Please refer to the flowsheet for vital signs taken during this treatment.    After treatment:   Patient left in no apparent distress sitting up in chair, Call bell within reach, and Bed/ chair alarm activated    COMMUNICATION/EDUCATION:   The patient's plan of care was discussed with: physical therapist, registered nurse, and     Patient Education  Education Given To: Patient  Education Provided: Role of Therapy;Transfer Training;Plan of Care;ADL Adaptive Strategies;Fall Prevention Strategies  Education Method: Verbal  Barriers to Learning: None  Education Outcome: Verbalized understanding    Thank you for this referral.  Rita Nguyễn OT  Minutes: 25

## 2023-10-30 NOTE — PLAN OF CARE
Problem: Physical Therapy - Adult  Goal: By Discharge: Performs mobility at highest level of function for planned discharge setting. See evaluation for individualized goals. Description: FUNCTIONAL STATUS PRIOR TO ADMISSION: Pt reports he is I at w/c level and able to use 1 of 2 methods to transfer to/from his electric scooter(squat/stand pivot or lateral scoot). HOME SUPPORT PRIOR TO ADMISSION: The patient lived alone with caregivers and friends to provide assistance. Physical Therapy Goals  Initiated 10/28/2023  1. Patient will move from supine to sit and sit to supine, scoot up and down, and roll side to side in bed with modified independence within 7 day(s). 2.  Patient will perform sit to stand with modified independence within 7 day(s). 3.  Patient will transfer from bed to chair and chair to bed with modified independence using the least restrictive device within 7 day(s). Outcome: Progressing     PHYSICAL THERAPY TREATMENT    Patient: Edvin Holland (15 y.o. male)  Date: 10/30/2023  Diagnosis: Garbled speech [R47.89]  Cerebrovascular accident (CVA), unspecified mechanism ([address]) [I63.9] Cerebrovascular accident (CVA), unspecified mechanism ([address])      Precautions:                      ASSESSMENT:  Patient continues to benefit from skilled PT services and is progressing towards goals. Patient cleared by nursing to mobilize. Patient able to get to EOB with standby assist, HOB was significantly elevated. Sitting balance intact. Performed bed-chair transfer via squat pivot method with standby assist, good BUE strength. Able to perform sit<>stand transfer from chair in order to place waffle cushion underneath his bottom for pressure and pain relief. Provided education on performing pressure relief frequently to prevent skin breakdown. Patient appears near his functional baseline and has no concerns about returning home with his caregivers and home health therapy.          PLAN:  Patient continues

## 2023-10-30 NOTE — CARE COORDINATION
Transition of Care Plan:     RUR: 15% moderate    Prior Level of Functioning: assistance with ADLs    Disposition: Home. [facility] to set up [address] through PCP office. CM to fax DC summary once it is in.     3:11 PM   CM talked to Hospitalist and just waiting on ECHO Read. Plan Home today. CM set up [facility] for 6 PM.  CM let RN know. SMART tool given to RN. No further CM needs    2:31 PM   CM talked to RN supervisor and they approved [facility] Kellie Denier transport if Pt is clear for DC this evening. 1:42 PM   CM completed chart review. Requested transfer to [facility]. Forms sent to [facility] this weekend. CM called [phone] ext [phone] CM talked to Lorna Barkley and she indicated that I need to send the order for [address] and notes to [employer]. They will send the [address] orders to PCP and they will order the [address] PT/OT to come to the home. Information faxed as requested. Pt will need WC Kellie Denier transport arranged through [facility] or [facility]: Aides: Kael Jhaveri: 6 hours per day. [facility]: 9/20/23 in the past.      We are waiting for Echo to be read. WC Kellie Denier will need to be arranged. Pt has a key for door. Follow up appointments: pt receives most care at [facility]  DME needed: none  Transportation at discharge: [address]  IM/IMM Medicare/ letter given: n/a  Is patient a [place] and connected with VA? yes              If yes, was [employer] transfer form completed and [facility] notified? yes  Caregiver Contact: Kael Jhaveri (Child)   [phone]  Discharge Caregiver contacted prior to discharge? If requested by pt  Care Conference needed? no  Barriers to discharge: medical clearance    CM will continue to monitor discharge plan.      Juvenal Harvey

## 2023-10-30 NOTE — PROGRESS NOTES
Received notification from bedside RN about patient with regards to: persistent constipation despite PRN Miralax, requesting additional bowel regimen  VS: /115, HR 63, RR 14, O2 sat 99% on RA    Intervention given:   - Summer-colace BID  - Dulcolax supp PRN

## 2023-10-30 NOTE — DISCHARGE SUMMARY
Discharge Summary    Name: Flex Benton  [id number]  YOB: 1945 (Age: 78 y.o.)   Date of Admission: 10/27/2023  Date of Discharge: 10/30/2023  Attending Physician: Dale Mckinnon MD    Discharge Diagnosis:   TIA/CVA  Paraplegia  Essential hypertension  Hyperlipidemia  CAD  Diabetes mellitus  VANNESA compliant with CPAP  Ulcerative colitis  BPH  Diabetes mellitus  VANNESA compliant with CPAP  Ulcerative colitis  BPH         Consultations:  IP CONSULT TO NEUROLOGY  IP CONSULT HOME HEALTH      Brief Admission History/Reason for Admission Per Huseyin Phan, DO:   Flex Benton is a 78 y.o.  male with PMHx as listed below presenting with concerns for acute onset dizziness (vertiginous symptoms) and slurred speech as well as right-sided facial droop and abnormal left-sided sensorium prompting her to present to emergency department for evaluation.  On arrival patient triggered a level 1 stroke alert.  CT neuro protocol followed without acute process detected.  Teleneurologist evaluated patient recommended medicine admission with formal MRI brain and neurology consultation in the morning.  Do not recommend tPA at this time.  Patient somnolent but appropriate and easy to arouse at time of my encounter.  Denies complaints or concerns but reports persistent slurred speech and difficulty formulating words as well as left-sided numbness/tingling.     In the ED, patient afebrile and hemodynamically stable saturating upper 90s on room air.  ECG demonstrates sinus bradycardia without definitive ischemic change.  CT neuro protocol negative for acute process limited by movement artifact.  Labs demonstrate: Troponin 13, INR 1.1, CBC grossly unremarkable, CMP grossly unremarkable, point-of-care lactic 1.16, glucose on arrival 129.    Brief Hospital Course by Main Problems:   TIA/CVA  Paraplegia  Essential hypertension  Hyperlipidemia  CAD  Diabetes mellitus  VANNESA compliant with  clopidogrel 75 MG tablet  Commonly known as: PLAVIX  Take 1 tablet by mouth daily for 20 doses  Start taking on: October 31, 2023            CHANGE how you take these medications      pregabalin 75 MG capsule  Commonly known as: LYRICA  What changed: Another medication with the same name was removed. Continue taking this medication, and follow the directions you see here.             CONTINUE taking these medications      acetaminophen 325 MG tablet  Commonly known as: TYLENOL     albuterol sulfate  (90 Base) MCG/ACT inhaler  Commonly known as: PROVENTIL;VENTOLIN;PROAIR     baclofen 10 MG tablet  Commonly known as: LIORESAL     calcium carbonate 1250 (500 Ca) MG chewable tablet  Commonly known as: OS-BERNADINE     capsaicin 0.025 % cream  Commonly known as: ZOSTRIX     Cholecalciferol 50 MCG (2000 UT) Tabs     dextran 70-hypromellose 0.1-0.3 % Soln opthalmic solution  Commonly known as: TEARS NATURALE     docusate 100 MG Caps  Commonly known as: COLACE, DULCOLAX     fluticasone 50 MCG/ACT nasal spray  Commonly known as: FLONASE     guaiFENesin 100 MG/5ML liquid  Commonly known as: ROBITUSSIN     hydroCHLOROthiazide 12.5 MG tablet  Commonly known as: HYDRODIURIL     lisinopril 5 MG tablet  Commonly known as: PRINIVIL;ZESTRIL     loratadine 10 MG tablet  Commonly known as: CLARITIN     mesalamine 0.375 g extended release capsule  Commonly known as: APRISO     metFORMIN 1000 MG tablet  Commonly known as: GLUCOPHAGE     rosuvastatin 40 MG tablet  Commonly known as: CRESTOR     senna 8.6 MG tablet  Commonly known as: SENOKOT     tamsulosin 0.4 MG capsule  Commonly known as: FLOMAX            STOP taking these medications      tiZANidine 4 MG tablet  Commonly known as: Duaen                Where to Get Your Medications        These medications were sent to [facility], [address] [place] [address] [address] [phone] - F [address]  [address] [place], [place] [id number]      Phone: [phone]

## 2023-11-03 ENCOUNTER — TELEPHONE (OUTPATIENT)
Age: 78
End: 2023-11-03